# Patient Record
Sex: FEMALE | Race: WHITE | NOT HISPANIC OR LATINO
[De-identification: names, ages, dates, MRNs, and addresses within clinical notes are randomized per-mention and may not be internally consistent; named-entity substitution may affect disease eponyms.]

---

## 2017-11-21 ENCOUNTER — CHARTING TRANS (OUTPATIENT)
Dept: OTHER | Age: 32
End: 2017-11-21

## 2017-11-22 ENCOUNTER — CHARTING TRANS (OUTPATIENT)
Dept: OTHER | Age: 32
End: 2017-11-22

## 2017-12-05 ENCOUNTER — LAB SERVICES (OUTPATIENT)
Dept: OTHER | Age: 32
End: 2017-12-05

## 2017-12-05 ENCOUNTER — CHARTING TRANS (OUTPATIENT)
Dept: OTHER | Age: 32
End: 2017-12-05

## 2017-12-07 LAB — AP REPORT: NORMAL

## 2017-12-15 LAB — HPV I/H RISK 4 DNA CVX QL NAA+PROBE: NORMAL

## 2018-11-27 VITALS
SYSTOLIC BLOOD PRESSURE: 138 MMHG | HEIGHT: 66 IN | BODY MASS INDEX: 30.37 KG/M2 | DIASTOLIC BLOOD PRESSURE: 90 MMHG | WEIGHT: 189 LBS

## 2018-12-04 PROCEDURE — 87624 HPV HI-RISK TYP POOLED RSLT: CPT | Performed by: OBSTETRICS & GYNECOLOGY

## 2018-12-04 PROCEDURE — 87625 HPV TYPES 16 & 18 ONLY: CPT | Performed by: OBSTETRICS & GYNECOLOGY

## 2018-12-04 PROCEDURE — 88175 CYTOPATH C/V AUTO FLUID REDO: CPT | Performed by: OBSTETRICS & GYNECOLOGY

## 2019-01-10 PROCEDURE — 88342 IMHCHEM/IMCYTCHM 1ST ANTB: CPT | Performed by: OBSTETRICS & GYNECOLOGY

## 2019-01-10 PROCEDURE — 88305 TISSUE EXAM BY PATHOLOGIST: CPT | Performed by: OBSTETRICS & GYNECOLOGY

## 2019-02-12 ENCOUNTER — HOSPITAL ENCOUNTER (OUTPATIENT)
Facility: HOSPITAL | Age: 34
Setting detail: HOSPITAL OUTPATIENT SURGERY
Discharge: HOME | End: 2019-02-12
Attending: OBSTETRICS & GYNECOLOGY | Admitting: OBSTETRICS & GYNECOLOGY
Payer: COMMERCIAL

## 2019-02-12 VITALS
HEIGHT: 63 IN | SYSTOLIC BLOOD PRESSURE: 124 MMHG | HEART RATE: 72 BPM | RESPIRATION RATE: 16 BRPM | WEIGHT: 198 LBS | BODY MASS INDEX: 35.08 KG/M2 | OXYGEN SATURATION: 97 % | DIASTOLIC BLOOD PRESSURE: 86 MMHG | TEMPERATURE: 98 F

## 2019-02-12 LAB
POCT LOT NUMBER: NORMAL
POCT URINE PREGNANCY: NEGATIVE
PROCEDURE CONTROL: YES

## 2019-02-12 PROCEDURE — 88307 TISSUE EXAM BY PATHOLOGIST: CPT | Performed by: OBSTETRICS & GYNECOLOGY

## 2019-02-12 PROCEDURE — 88342 IMHCHEM/IMCYTCHM 1ST ANTB: CPT | Performed by: OBSTETRICS & GYNECOLOGY

## 2019-02-12 PROCEDURE — 0UBC7ZZ EXCISION OF CERVIX, VIA NATURAL OR ARTIFICIAL OPENING: ICD-10-PCS | Performed by: OBSTETRICS & GYNECOLOGY

## 2019-02-12 PROCEDURE — 81025 URINE PREGNANCY TEST: CPT | Performed by: OBSTETRICS & GYNECOLOGY

## 2019-02-12 RX ORDER — HYDROCODONE BITARTRATE AND ACETAMINOPHEN 5; 325 MG/1; MG/1
1 TABLET ORAL AS NEEDED
Status: DISCONTINUED | OUTPATIENT
Start: 2019-02-12 | End: 2019-02-12

## 2019-02-12 RX ORDER — ACETAMINOPHEN 500 MG
1000 TABLET ORAL ONCE
Status: DISCONTINUED | OUTPATIENT
Start: 2019-02-12 | End: 2019-02-12

## 2019-02-12 RX ORDER — SODIUM CHLORIDE, SODIUM LACTATE, POTASSIUM CHLORIDE, CALCIUM CHLORIDE 600; 310; 30; 20 MG/100ML; MG/100ML; MG/100ML; MG/100ML
INJECTION, SOLUTION INTRAVENOUS CONTINUOUS
Status: DISCONTINUED | OUTPATIENT
Start: 2019-02-12 | End: 2019-02-12

## 2019-02-12 RX ORDER — ONDANSETRON 2 MG/ML
4 INJECTION INTRAMUSCULAR; INTRAVENOUS AS NEEDED
Status: DISCONTINUED | OUTPATIENT
Start: 2019-02-12 | End: 2019-02-12

## 2019-02-12 RX ORDER — KETOROLAC TROMETHAMINE 30 MG/ML
30 INJECTION, SOLUTION INTRAMUSCULAR; INTRAVENOUS EVERY 6 HOURS PRN
Status: CANCELLED | OUTPATIENT
Start: 2019-02-12 | End: 2019-02-12

## 2019-02-12 RX ORDER — HYDROCODONE BITARTRATE AND ACETAMINOPHEN 5; 325 MG/1; MG/1
2 TABLET ORAL AS NEEDED
Status: DISCONTINUED | OUTPATIENT
Start: 2019-02-12 | End: 2019-02-12

## 2019-02-12 RX ORDER — CEFAZOLIN SODIUM/WATER 2 G/20 ML
2 SYRINGE (ML) INTRAVENOUS ONCE
Status: DISCONTINUED | OUTPATIENT
Start: 2019-02-12 | End: 2019-02-12

## 2019-02-12 RX ORDER — HYDROMORPHONE HYDROCHLORIDE 1 MG/ML
0.4 INJECTION, SOLUTION INTRAMUSCULAR; INTRAVENOUS; SUBCUTANEOUS EVERY 5 MIN PRN
Status: DISCONTINUED | OUTPATIENT
Start: 2019-02-12 | End: 2019-02-12

## 2019-02-12 RX ORDER — ACETAMINOPHEN 500 MG
1000 TABLET ORAL ONCE
COMMUNITY
End: 2019-07-24 | Stop reason: ALTCHOICE

## 2019-02-12 RX ORDER — METOCLOPRAMIDE HYDROCHLORIDE 5 MG/ML
10 INJECTION INTRAMUSCULAR; INTRAVENOUS AS NEEDED
Status: DISCONTINUED | OUTPATIENT
Start: 2019-02-12 | End: 2019-02-12

## 2019-02-12 RX ORDER — NALOXONE HYDROCHLORIDE 0.4 MG/ML
80 INJECTION, SOLUTION INTRAMUSCULAR; INTRAVENOUS; SUBCUTANEOUS AS NEEDED
Status: DISCONTINUED | OUTPATIENT
Start: 2019-02-12 | End: 2019-02-12

## 2019-02-12 NOTE — PROGRESS NOTES
[facility]  Brief Op Note    Rudy Mason Location: OR   CSN [id number] MRN [id number]   Admission Date 2/12/2019 Operation Date 2/12/2019   Attending Physician No att. providers found Operating Physician Aminta Saleh MD     Pre-Operat

## 2019-09-26 PROCEDURE — 88175 CYTOPATH C/V AUTO FLUID REDO: CPT | Performed by: OBSTETRICS & GYNECOLOGY

## 2019-09-26 PROCEDURE — 87624 HPV HI-RISK TYP POOLED RSLT: CPT | Performed by: OBSTETRICS & GYNECOLOGY

## 2021-04-08 NOTE — PROGRESS NOTES
Outpatient Maternal-Fetal Medicine Consultation    Dear Dr. Matt Mackey,    Thank you for requesting ultrasound evaluation and maternal fetal medicine consultation on your patient Aram Adamson.  As you are aware she is a 37year old female with a S Allergies: No Known Allergies      PHYSICAL EXAMINATION:  /86   Pulse 83   Ht 5' 3\" (1.6 m)   Wt 205 lb (93 kg)   LMP 11/23/2020   BMI 36.31 kg/m²   General: alert and oriented,no acute distress  Abdomen: gravid, soft, non-tender  Extremities: non delivery by ~ 40% in women with a sandoval pregnancy and ultrasound finding of short cervix with or without a history of  delivery.   Cervical cerclage is indicated in patients with prior  delivery and short cervix and in some patients with v of  birth in these patients, therefore, should be directed toward prevention or management of medical and obstetrical complications.                Both prepregnancy obesity and excessive maternal weight gain before or during pregnancy contribute to gravidas significantly reduced the incidence of gestational diabetes, gestational hypertension,  deliveries and C-sections.   In the study, women were assigned to riding a stationary cycle for  30 minutes 3 times per week, keeping HR<140 bpm.  OLGA camacho hesitate to contact me if additional questions or concerns arise. Edith Weston M.D. The majority of the time (>50%) was spent in review of records, consultation and coordination of care. Our discussion is summarized above.  The approximate physician

## 2021-04-16 ENCOUNTER — OFFICE VISIT (OUTPATIENT)
Dept: PERINATAL CARE | Facility: HOSPITAL | Age: 36
End: 2021-04-16
Attending: OBSTETRICS & GYNECOLOGY
Payer: COMMERCIAL

## 2021-04-16 VITALS
SYSTOLIC BLOOD PRESSURE: 125 MMHG | BODY MASS INDEX: 36.32 KG/M2 | DIASTOLIC BLOOD PRESSURE: 86 MMHG | HEART RATE: 83 BPM | WEIGHT: 205 LBS | HEIGHT: 63 IN

## 2021-04-16 DIAGNOSIS — O34.12 UTERINE FIBROIDS AFFECTING PREGNANCY IN SECOND TRIMESTER: ICD-10-CM

## 2021-04-16 DIAGNOSIS — Z98.890 H/O LEEP (LOOP ELECTROSURGICAL EXCISION PROCEDURE) OF CERVIX COMPLICATING PREGNANCY, SECOND TRIMESTER: ICD-10-CM

## 2021-04-16 DIAGNOSIS — O99.212 OBESITY AFFECTING PREGNANCY IN SECOND TRIMESTER: ICD-10-CM

## 2021-04-16 DIAGNOSIS — O34.42 H/O LEEP (LOOP ELECTROSURGICAL EXCISION PROCEDURE) OF CERVIX COMPLICATING PREGNANCY, SECOND TRIMESTER: ICD-10-CM

## 2021-04-16 DIAGNOSIS — D25.9 UTERINE FIBROIDS AFFECTING PREGNANCY IN SECOND TRIMESTER: ICD-10-CM

## 2021-04-16 DIAGNOSIS — O99.212 OBESITY AFFECTING PREGNANCY IN SECOND TRIMESTER: Primary | ICD-10-CM

## 2021-04-16 PROBLEM — O99.210 OBESITY COMPLICATING PREGNANCY: Status: ACTIVE | Noted: 2021-04-16

## 2021-04-16 PROCEDURE — 76811 OB US DETAILED SNGL FETUS: CPT | Performed by: OBSTETRICS & GYNECOLOGY

## 2021-04-16 PROCEDURE — 99244 OFF/OP CNSLTJ NEW/EST MOD 40: CPT | Performed by: OBSTETRICS & GYNECOLOGY

## 2021-04-16 RX ORDER — ASPIRIN 81 MG/1
81 TABLET ORAL DAILY
Status: ON HOLD | COMMUNITY
End: 2021-08-21

## 2021-04-16 NOTE — PROGRESS NOTES
Pt here for level II ultrasound  +FM  No complaints, denies headache, blurry vision, epigastric pain or sudden swelling

## 2021-08-17 ENCOUNTER — HOSPITAL ENCOUNTER (INPATIENT)
Facility: HOSPITAL | Age: 36
LOS: 5 days | Discharge: HOME | End: 2021-08-22
Attending: OBSTETRICS & GYNECOLOGY | Admitting: OBSTETRICS & GYNECOLOGY
Payer: COMMERCIAL

## 2021-08-17 PROBLEM — Z34.90 PREGNANCY: Status: ACTIVE | Noted: 2021-08-17

## 2021-08-17 LAB
ALBUMIN SERPL-MCNC: 2.9 G/DL (ref 3.4–5)
ALBUMIN/GLOB SERPL: 0.7 {RATIO} (ref 1–2)
ALP LIVER SERPL-CCNC: 179 U/L
ALT SERPL-CCNC: 27 U/L
ANION GAP SERPL CALC-SCNC: 6 MMOL/L (ref 0–18)
ANTIBODY SCREEN: NEGATIVE
AST SERPL-CCNC: 14 U/L (ref 15–37)
BASOPHILS # BLD AUTO: 0.06 X10(3) UL (ref 0–0.2)
BASOPHILS NFR BLD AUTO: 0.4 %
BILIRUB SERPL-MCNC: 0.3 MG/DL (ref 0.1–2)
BILIRUBIN URINE: NEGATIVE
BLOOD URINE: NEGATIVE
BUN BLD-MCNC: 9 MG/DL (ref 7–18)
CALCIUM BLD-MCNC: 8.8 MG/DL (ref 8.5–10.1)
CHLORIDE SERPL-SCNC: 110 MMOL/L (ref 98–112)
CO2 SERPL-SCNC: 23 MMOL/L (ref 21–32)
CONTROL RUN WITHIN 24 HOURS?: YES
CREAT BLD-MCNC: 0.68 MG/DL
CREAT UR-SCNC: <13 MG/DL
EOSINOPHIL # BLD AUTO: 0.3 X10(3) UL (ref 0–0.7)
EOSINOPHIL NFR BLD AUTO: 1.9 %
ERYTHROCYTE [DISTWIDTH] IN BLOOD BY AUTOMATED COUNT: 14.3 %
GLOBULIN PLAS-MCNC: 3.9 G/DL (ref 2.8–4.4)
GLUCOSE BLD-MCNC: 85 MG/DL (ref 70–99)
GLUCOSE URINE: NEGATIVE
HCT VFR BLD AUTO: 36.8 %
HGB BLD-MCNC: 12.1 G/DL
IMM GRANULOCYTES # BLD AUTO: 0.14 X10(3) UL (ref 0–1)
IMM GRANULOCYTES NFR BLD: 0.9 %
KETONE URINE: NEGATIVE
LEUKOCYTE ESTERASE URINE: NEGATIVE
LYMPHOCYTES # BLD AUTO: 2.2 X10(3) UL (ref 1–4)
LYMPHOCYTES NFR BLD AUTO: 14.1 %
M PROTEIN MFR SERPL ELPH: 6.8 G/DL (ref 6.4–8.2)
MCH RBC QN AUTO: 31.4 PG (ref 26–34)
MCHC RBC AUTO-ENTMCNC: 32.9 G/DL (ref 31–37)
MCV RBC AUTO: 95.6 FL
MONOCYTES # BLD AUTO: 0.99 X10(3) UL (ref 0.1–1)
MONOCYTES NFR BLD AUTO: 6.4 %
NEUTROPHILS # BLD AUTO: 11.86 X10 (3) UL (ref 1.5–7.7)
NEUTROPHILS # BLD AUTO: 11.86 X10(3) UL (ref 1.5–7.7)
NEUTROPHILS NFR BLD AUTO: 76.3 %
NITRITE URINE: NEGATIVE
OSMOLALITY SERPL CALC.SUM OF ELEC: 286 MOSM/KG (ref 275–295)
PH URINE: 7 (ref 5–8)
PLATELET # BLD AUTO: 231 10(3)UL (ref 150–450)
POTASSIUM SERPL-SCNC: 3.8 MMOL/L (ref 3.5–5.1)
PROT UR-MCNC: 5.3 MG/DL
PROTEIN URINE: NEGATIVE
RBC # BLD AUTO: 3.85 X10(6)UL
RH BLOOD TYPE: POSITIVE
SARS-COV-2 RNA RESP QL NAA+PROBE: NOT DETECTED
SODIUM SERPL-SCNC: 139 MMOL/L (ref 136–145)
SPEC GRAVITY: 1.01 (ref 1–1.03)
T PALLIDUM AB SER QL IA: NONREACTIVE
URATE SERPL-MCNC: 5.1 MG/DL
URINE CLARITY: CLEAR
URINE COLOR: YELLOW
UROBILINOGEN URINE: 0.2
WBC # BLD AUTO: 15.6 X10(3) UL (ref 4–11)

## 2021-08-17 PROCEDURE — 86850 RBC ANTIBODY SCREEN: CPT | Performed by: OBSTETRICS & GYNECOLOGY

## 2021-08-17 PROCEDURE — 85025 COMPLETE CBC W/AUTO DIFF WBC: CPT | Performed by: OBSTETRICS & GYNECOLOGY

## 2021-08-17 PROCEDURE — 84156 ASSAY OF PROTEIN URINE: CPT | Performed by: OBSTETRICS & GYNECOLOGY

## 2021-08-17 PROCEDURE — 86900 BLOOD TYPING SEROLOGIC ABO: CPT | Performed by: OBSTETRICS & GYNECOLOGY

## 2021-08-17 PROCEDURE — 82570 ASSAY OF URINE CREATININE: CPT | Performed by: OBSTETRICS & GYNECOLOGY

## 2021-08-17 PROCEDURE — 80053 COMPREHEN METABOLIC PANEL: CPT | Performed by: OBSTETRICS & GYNECOLOGY

## 2021-08-17 PROCEDURE — 86780 TREPONEMA PALLIDUM: CPT | Performed by: OBSTETRICS & GYNECOLOGY

## 2021-08-17 PROCEDURE — 86901 BLOOD TYPING SEROLOGIC RH(D): CPT | Performed by: OBSTETRICS & GYNECOLOGY

## 2021-08-17 PROCEDURE — 3E0P7VZ INTRODUCTION OF HORMONE INTO FEMALE REPRODUCTIVE, VIA NATURAL OR ARTIFICIAL OPENING: ICD-10-PCS | Performed by: OBSTETRICS & GYNECOLOGY

## 2021-08-17 PROCEDURE — 84550 ASSAY OF BLOOD/URIC ACID: CPT | Performed by: OBSTETRICS & GYNECOLOGY

## 2021-08-17 PROCEDURE — 99214 OFFICE O/P EST MOD 30 MIN: CPT

## 2021-08-17 RX ORDER — TERBUTALINE SULFATE 1 MG/ML
0.25 INJECTION, SOLUTION SUBCUTANEOUS AS NEEDED
Status: DISCONTINUED | OUTPATIENT
Start: 2021-08-17 | End: 2021-08-18

## 2021-08-17 RX ORDER — SODIUM CHLORIDE, SODIUM LACTATE, POTASSIUM CHLORIDE, CALCIUM CHLORIDE 600; 310; 30; 20 MG/100ML; MG/100ML; MG/100ML; MG/100ML
INJECTION, SOLUTION INTRAVENOUS CONTINUOUS
Status: DISCONTINUED | OUTPATIENT
Start: 2021-08-17 | End: 2021-08-18

## 2021-08-17 RX ORDER — IBUPROFEN 600 MG/1
600 TABLET ORAL EVERY 6 HOURS PRN
Status: DISCONTINUED | OUTPATIENT
Start: 2021-08-17 | End: 2021-08-18

## 2021-08-17 RX ORDER — ACETAMINOPHEN 500 MG
500 TABLET ORAL EVERY 6 HOURS PRN
Status: DISCONTINUED | OUTPATIENT
Start: 2021-08-17 | End: 2021-08-18

## 2021-08-17 RX ORDER — ONDANSETRON 2 MG/ML
4 INJECTION INTRAMUSCULAR; INTRAVENOUS EVERY 6 HOURS PRN
Status: DISCONTINUED | OUTPATIENT
Start: 2021-08-17 | End: 2021-08-18

## 2021-08-17 RX ORDER — TRISODIUM CITRATE DIHYDRATE AND CITRIC ACID MONOHYDRATE 500; 334 MG/5ML; MG/5ML
30 SOLUTION ORAL AS NEEDED
Status: COMPLETED | OUTPATIENT
Start: 2021-08-17 | End: 2021-08-18

## 2021-08-17 RX ORDER — AMMONIA INHALANTS 0.04 G/.3ML
0.3 INHALANT RESPIRATORY (INHALATION) AS NEEDED
Status: DISCONTINUED | OUTPATIENT
Start: 2021-08-17 | End: 2021-08-18

## 2021-08-17 RX ORDER — DEXTROSE, SODIUM CHLORIDE, SODIUM LACTATE, POTASSIUM CHLORIDE, AND CALCIUM CHLORIDE 5; .6; .31; .03; .02 G/100ML; G/100ML; G/100ML; G/100ML; G/100ML
INJECTION, SOLUTION INTRAVENOUS AS NEEDED
Status: DISCONTINUED | OUTPATIENT
Start: 2021-08-17 | End: 2021-08-18

## 2021-08-17 NOTE — H&P
Pt is 36 y/o G1 at 38w 1 d who was sent from the office for increased BPs    PMH - Known 7 cm posterior fibroid              Failed 1 hour - 1 abnl on 3 hour              Previous AIS - leep done              Was on Baby ASA to lower risk of developing pre

## 2021-08-17 NOTE — PROGRESS NOTES
Pt is a 37year old female admitted to TRG2/TRG2-A. Patient presents with:  R/o Pih: had elevated pressures in office     Pt is  38w1d intra-uterine pregnancy. History obtained, consents signed. Oriented to room, staff, and plan of care.

## 2021-08-18 ENCOUNTER — ANESTHESIA EVENT (OUTPATIENT)
Dept: OBGYN UNIT | Facility: HOSPITAL | Age: 36
End: 2021-08-18
Payer: COMMERCIAL

## 2021-08-18 ENCOUNTER — ANESTHESIA (OUTPATIENT)
Dept: OBGYN UNIT | Facility: HOSPITAL | Age: 36
End: 2021-08-18
Payer: COMMERCIAL

## 2021-08-18 PROCEDURE — 3E033VJ INTRODUCTION OF OTHER HORMONE INTO PERIPHERAL VEIN, PERCUTANEOUS APPROACH: ICD-10-PCS | Performed by: OBSTETRICS & GYNECOLOGY

## 2021-08-18 RX ORDER — DIPHENHYDRAMINE HYDROCHLORIDE 50 MG/ML
25 INJECTION INTRAMUSCULAR; INTRAVENOUS ONCE AS NEEDED
Status: DISCONTINUED | OUTPATIENT
Start: 2021-08-18 | End: 2021-08-18

## 2021-08-18 RX ORDER — DIPHENHYDRAMINE HYDROCHLORIDE 50 MG/ML
12.5 INJECTION INTRAMUSCULAR; INTRAVENOUS EVERY 4 HOURS PRN
Status: DISCONTINUED | OUTPATIENT
Start: 2021-08-18 | End: 2021-08-22

## 2021-08-18 RX ORDER — KETOROLAC TROMETHAMINE 30 MG/ML
INJECTION, SOLUTION INTRAMUSCULAR; INTRAVENOUS
Status: COMPLETED
Start: 2021-08-18 | End: 2021-08-18

## 2021-08-18 RX ORDER — HYDROCODONE BITARTRATE AND ACETAMINOPHEN 5; 325 MG/1; MG/1
1 TABLET ORAL EVERY 6 HOURS PRN
Status: DISCONTINUED | OUTPATIENT
Start: 2021-08-18 | End: 2021-08-22

## 2021-08-18 RX ORDER — CEFAZOLIN SODIUM/WATER 2 G/20 ML
SYRINGE (ML) INTRAVENOUS
Status: DISPENSED
Start: 2021-08-18 | End: 2021-08-18

## 2021-08-18 RX ORDER — NALOXONE HYDROCHLORIDE 0.4 MG/ML
0.08 INJECTION, SOLUTION INTRAMUSCULAR; INTRAVENOUS; SUBCUTANEOUS
Status: ACTIVE | OUTPATIENT
Start: 2021-08-18 | End: 2021-08-19

## 2021-08-18 RX ORDER — NALBUPHINE HCL 10 MG/ML
2.5 AMPUL (ML) INJECTION
Status: DISCONTINUED | OUTPATIENT
Start: 2021-08-18 | End: 2021-08-18

## 2021-08-18 RX ORDER — BISACODYL 10 MG
10 SUPPOSITORY, RECTAL RECTAL
Status: DISCONTINUED | OUTPATIENT
Start: 2021-08-18 | End: 2021-08-22

## 2021-08-18 RX ORDER — SIMETHICONE 80 MG
80 TABLET,CHEWABLE ORAL 3 TIMES DAILY PRN
Status: DISCONTINUED | OUTPATIENT
Start: 2021-08-18 | End: 2021-08-22

## 2021-08-18 RX ORDER — KETOROLAC TROMETHAMINE 30 MG/ML
30 INJECTION, SOLUTION INTRAMUSCULAR; INTRAVENOUS ONCE AS NEEDED
Status: DISCONTINUED | OUTPATIENT
Start: 2021-08-18 | End: 2021-08-18

## 2021-08-18 RX ORDER — BUPIVACAINE HYDROCHLORIDE 7.5 MG/ML
INJECTION, SOLUTION INTRASPINAL AS NEEDED
Status: DISCONTINUED | OUTPATIENT
Start: 2021-08-18 | End: 2021-08-18 | Stop reason: SURG

## 2021-08-18 RX ORDER — ONDANSETRON 2 MG/ML
INJECTION INTRAMUSCULAR; INTRAVENOUS AS NEEDED
Status: DISCONTINUED | OUTPATIENT
Start: 2021-08-18 | End: 2021-08-18 | Stop reason: SURG

## 2021-08-18 RX ORDER — DOCUSATE SODIUM 100 MG/1
100 CAPSULE, LIQUID FILLED ORAL
Status: DISCONTINUED | OUTPATIENT
Start: 2021-08-19 | End: 2021-08-22

## 2021-08-18 RX ORDER — KETOROLAC TROMETHAMINE 30 MG/ML
30 INJECTION, SOLUTION INTRAMUSCULAR; INTRAVENOUS EVERY 6 HOURS
Status: DISPENSED | OUTPATIENT
Start: 2021-08-18 | End: 2021-08-19

## 2021-08-18 RX ORDER — CEFAZOLIN SODIUM/WATER 2 G/20 ML
2 SYRINGE (ML) INTRAVENOUS EVERY 6 HOURS
Status: COMPLETED | OUTPATIENT
Start: 2021-08-18 | End: 2021-08-18

## 2021-08-18 RX ORDER — IBUPROFEN 600 MG/1
600 TABLET ORAL EVERY 6 HOURS
Status: DISCONTINUED | OUTPATIENT
Start: 2021-08-19 | End: 2021-08-22

## 2021-08-18 RX ORDER — HYDROCODONE BITARTRATE AND ACETAMINOPHEN 5; 325 MG/1; MG/1
2 TABLET ORAL EVERY 6 HOURS PRN
Status: DISCONTINUED | OUTPATIENT
Start: 2021-08-18 | End: 2021-08-22

## 2021-08-18 RX ORDER — SODIUM CHLORIDE, SODIUM LACTATE, POTASSIUM CHLORIDE, CALCIUM CHLORIDE 600; 310; 30; 20 MG/100ML; MG/100ML; MG/100ML; MG/100ML
INJECTION, SOLUTION INTRAVENOUS CONTINUOUS
Status: DISCONTINUED | OUTPATIENT
Start: 2021-08-18 | End: 2021-08-18

## 2021-08-18 RX ORDER — NALBUPHINE HCL 10 MG/ML
2.5 AMPUL (ML) INJECTION EVERY 4 HOURS PRN
Status: DISCONTINUED | OUTPATIENT
Start: 2021-08-18 | End: 2021-08-22

## 2021-08-18 RX ORDER — ACETAMINOPHEN 500 MG
1000 TABLET ORAL EVERY 6 HOURS
Status: DISCONTINUED | OUTPATIENT
Start: 2021-08-18 | End: 2021-08-22

## 2021-08-18 RX ORDER — DEXTROSE, SODIUM CHLORIDE, SODIUM LACTATE, POTASSIUM CHLORIDE, AND CALCIUM CHLORIDE 5; .6; .31; .03; .02 G/100ML; G/100ML; G/100ML; G/100ML; G/100ML
INJECTION, SOLUTION INTRAVENOUS CONTINUOUS PRN
Status: DISCONTINUED | OUTPATIENT
Start: 2021-08-18 | End: 2021-08-22

## 2021-08-18 RX ORDER — TRISODIUM CITRATE DIHYDRATE AND CITRIC ACID MONOHYDRATE 500; 334 MG/5ML; MG/5ML
SOLUTION ORAL
Status: COMPLETED
Start: 2021-08-18 | End: 2021-08-18

## 2021-08-18 RX ORDER — MORPHINE SULFATE 2 MG/ML
INJECTION, SOLUTION INTRAMUSCULAR; INTRAVENOUS AS NEEDED
Status: DISCONTINUED | OUTPATIENT
Start: 2021-08-18 | End: 2021-08-18 | Stop reason: SURG

## 2021-08-18 RX ORDER — CEFAZOLIN SODIUM/WATER 2 G/20 ML
2 SYRINGE (ML) INTRAVENOUS ONCE
Status: COMPLETED | OUTPATIENT
Start: 2021-08-18 | End: 2021-08-18

## 2021-08-18 RX ORDER — ONDANSETRON 2 MG/ML
4 INJECTION INTRAMUSCULAR; INTRAVENOUS ONCE AS NEEDED
Status: DISCONTINUED | OUTPATIENT
Start: 2021-08-18 | End: 2021-08-18

## 2021-08-18 RX ORDER — DIPHENHYDRAMINE HCL 25 MG
25 CAPSULE ORAL EVERY 4 HOURS PRN
Status: DISCONTINUED | OUTPATIENT
Start: 2021-08-18 | End: 2021-08-22

## 2021-08-18 RX ORDER — DEXAMETHASONE SODIUM PHOSPHATE 4 MG/ML
VIAL (ML) INJECTION AS NEEDED
Status: DISCONTINUED | OUTPATIENT
Start: 2021-08-18 | End: 2021-08-18 | Stop reason: SURG

## 2021-08-18 RX ORDER — ONDANSETRON 2 MG/ML
4 INJECTION INTRAMUSCULAR; INTRAVENOUS EVERY 6 HOURS PRN
Status: DISCONTINUED | OUTPATIENT
Start: 2021-08-18 | End: 2021-08-22

## 2021-08-18 RX ADMIN — ONDANSETRON 4 MG: 2 INJECTION INTRAMUSCULAR; INTRAVENOUS at 04:06:00

## 2021-08-18 RX ADMIN — DEXAMETHASONE SODIUM PHOSPHATE 4 MG: 4 MG/ML VIAL (ML) INJECTION at 03:54:00

## 2021-08-18 RX ADMIN — CEFAZOLIN SODIUM/WATER 2 G: 2 G/20 ML SYRINGE (ML) INTRAVENOUS at 03:53:00

## 2021-08-18 RX ADMIN — MORPHINE SULFATE 0.2 MG: 2 INJECTION, SOLUTION INTRAMUSCULAR; INTRAVENOUS at 03:51:00

## 2021-08-18 RX ADMIN — BUPIVACAINE HYDROCHLORIDE 1.6 ML: 7.5 INJECTION, SOLUTION INTRASPINAL at 03:51:00

## 2021-08-18 NOTE — BRIEF OP NOTE
Pre-Operative Diagnosis: 38.3 IUP Fetal intolerance of labor, gestational HTN     Post-Operative Diagnosis: Same      Procedure Performed: primary LTCS   SECTION    Surgeon(s) and Role:     Caridad Tellez MD - Primary     * Sendy Madrid MD - Assist

## 2021-08-18 NOTE — ANESTHESIA POSTPROCEDURE EVALUATION
[address] [place] [address] A Sutphen-Suni Patient Status:  Inpatient   Age/Gender 37year old female MRN [id number]   Location [facility] Attending Lucia Waller MD   Hosp Day # 1 PCP Maureen Mojica DO       Anesthesia Post-op N

## 2021-08-18 NOTE — PROGRESS NOTES
Pt feeling cramping - had 2 doses of cytotec - some random variables/early decels - not severe - moderate variability - + accels  Ve - still closed   A/P Baby stable - will not give 3rd dose of cytotec - will start pitocin about 1:30 and then go up slowly

## 2021-08-18 NOTE — ANESTHESIA PROCEDURE NOTES
Spinal Block  Performed by: Barbara Bernal MD  Authorized by: Barbara Bernal MD       General Information and Staff    Start Time:  8/18/2021 3:51 AM  End Time:  8/18/2021 3:52 AM  Anesthesiologist:  Barbara Bernal MD  Performed by:   Anesthesio

## 2021-08-18 NOTE — PLAN OF CARE
Problem: POSTPARTUM  Goal: Long Term Goal:Experiences normal postpartum course  Description: INTERVENTIONS:  - Assess and monitor vital signs and lab values. - Assess fundus and lochia. - Provide ice/sitz baths for perineum discomfort.   - Monitor heali pain/trauma. - Instruct and provide assistance with proper latch. - Review techniques for milk expression (breast pumping) and storage of breast milk. Provide pumping equipment/supplies, instructions and assistance, as needed.   - Encourage rooming-in and strengthening/mobility  - Encourage toileting schedule  Outcome: Progressing     Problem: SAFETY ADULT - FALL  Goal: Free from fall injury  Description: INTERVENTIONS:  - Assess pt frequently for physical needs  - Identify cognitive and physical deficits a

## 2021-08-18 NOTE — PROGRESS NOTES
Called to evaluate FHT's - decels that are more persistent - pitocin is currently off - late decels - moderate variability - had times of minimal   Pt not uncomfortable  A/P Fetal intolerance - pt remote from delivery - not in labor yet - D/W pt and her hu

## 2021-08-18 NOTE — ANESTHESIA PREPROCEDURE EVALUATION
PRE-OP EVALUATION    Patient Name: Latoya Hunt SYFHJRZ-FIWBLUY    Admit Diagnosis: Pregnancy    Pre-op Diagnosis: * No pre-op diagnosis entered *     SECTION    Anesthesia Procedure:  SECTION (N/A )    Surgeon(s) and Role:     Junie Huynh time        Allergies: Patient has no known allergies. Anesthesia Evaluation    Patient summary reviewed. Anesthetic Complications  (-) history of anesthetic complications         GI/Hepatic/Renal    Negative GI/hepatic/renal ROS. bilaterally.                Other findings            ASA: 2   Plan: spinal                             Present on Admission:  **None**

## 2021-08-18 NOTE — OPERATIVE REPORT
[address] [place]    PATIENT'S NAME: Leonila Rasmussen   ATTENDING PHYSICIAN: Rj Hickman M.D. OPERATING PHYSICIAN: Rj Hickman M.D.    PATIENT ACCOUNT#:   [de-identified]    LOCATION:  [address] [place] [address]  MEDICAL RECORD #:   [id number]       DATE fluid. As soon as the uterus was opened, the cord was presenting, which was wrapped around the baby's head, not neck. As the baby was delivered, the cord had been reduced. Nares and mouth were suctioned.   The rest of the infant delivered easily; gay

## 2021-08-19 LAB
BASOPHILS # BLD AUTO: 0.05 X10(3) UL (ref 0–0.2)
BASOPHILS NFR BLD AUTO: 0.3 %
EOSINOPHIL # BLD AUTO: 0.28 X10(3) UL (ref 0–0.7)
EOSINOPHIL NFR BLD AUTO: 1.7 %
ERYTHROCYTE [DISTWIDTH] IN BLOOD BY AUTOMATED COUNT: 14.4 %
HCT VFR BLD AUTO: 31.1 %
HGB BLD-MCNC: 10.5 G/DL
IMM GRANULOCYTES # BLD AUTO: 0.13 X10(3) UL (ref 0–1)
IMM GRANULOCYTES NFR BLD: 0.8 %
LYMPHOCYTES # BLD AUTO: 2.26 X10(3) UL (ref 1–4)
LYMPHOCYTES NFR BLD AUTO: 13.5 %
MCH RBC QN AUTO: 32.1 PG (ref 26–34)
MCHC RBC AUTO-ENTMCNC: 33.8 G/DL (ref 31–37)
MCV RBC AUTO: 95.1 FL
MONOCYTES # BLD AUTO: 1.11 X10(3) UL (ref 0.1–1)
MONOCYTES NFR BLD AUTO: 6.7 %
NEUTROPHILS # BLD AUTO: 12.86 X10 (3) UL (ref 1.5–7.7)
NEUTROPHILS # BLD AUTO: 12.86 X10(3) UL (ref 1.5–7.7)
NEUTROPHILS NFR BLD AUTO: 77 %
PLATELET # BLD AUTO: 188 10(3)UL (ref 150–450)
RBC # BLD AUTO: 3.27 X10(6)UL
WBC # BLD AUTO: 16.7 X10(3) UL (ref 4–11)

## 2021-08-19 PROCEDURE — 85025 COMPLETE CBC W/AUTO DIFF WBC: CPT | Performed by: OBSTETRICS & GYNECOLOGY

## 2021-08-19 NOTE — PROGRESS NOTES
Labor Analgesia Follow Up Note    Patient underwent epidural anesthesia for labor analgesia,    Placenta Date/Time: 8/18/2021  4:06 AM    Delivery Date/Time[de-identified] 8/18/2021  4:05 AM    /84 (BP Location: Left arm)   Pulse 109   Temp 98.6 °F (37 °C) (Oral)

## 2021-08-19 NOTE — PROGRESS NOTES
[facility] 1SW-J michael    Mike Decker Patient Status:  Inpatient   Age/Gender 37year old female MRN [id number]   [facility] 1SW-J Attending Matt Rowe MD   Hosp Day # 2 PCP Veronique Jacobs, DO      Anesthesia Pain

## 2021-08-19 NOTE — PROGRESS NOTES
OB Progress Note POD#1    S: She is feeling well. Minimal VB. Pain controlled. Breastfeeding. Voiding without difficulty, + flatus, no BM    O:  Blood pressure 137/84, pulse 109, temperature 98.6 °F (37 °C), temperature source Oral, resp.  rate 20, height 5

## 2021-08-20 RX ORDER — FUROSEMIDE 40 MG/1
40 TABLET ORAL ONCE
Status: COMPLETED | OUTPATIENT
Start: 2021-08-20 | End: 2021-08-20

## 2021-08-20 NOTE — PROGRESS NOTES
[facility]  Post-Partum Caesarean Section Progress Note    Sriram Fennel Patient Status:  Inpatient    10/8/1985 MRN [id number]   [facility] 1SW-J Attending Lilliam Nunez MD   Hosp Day # 3 PCP Tracie Warner DO     SUBJ

## 2021-08-21 RX ORDER — LABETALOL 200 MG/1
200 TABLET, FILM COATED ORAL EVERY 12 HOURS SCHEDULED
Qty: 60 TABLET | Refills: 0 | Status: SHIPPED | OUTPATIENT
Start: 2021-08-21 | End: 2021-09-17

## 2021-08-21 RX ORDER — LABETALOL 200 MG/1
200 TABLET, FILM COATED ORAL EVERY 12 HOURS SCHEDULED
Status: DISCONTINUED | OUTPATIENT
Start: 2021-08-21 | End: 2021-08-21

## 2021-08-21 RX ORDER — NIFEDIPINE 30 MG/1
30 TABLET, EXTENDED RELEASE ORAL DAILY
Status: DISCONTINUED | OUTPATIENT
Start: 2021-08-21 | End: 2021-08-22

## 2021-08-21 RX ORDER — LABETALOL HYDROCHLORIDE 5 MG/ML
INJECTION, SOLUTION INTRAVENOUS
Status: COMPLETED
Start: 2021-08-21 | End: 2021-08-21

## 2021-08-21 RX ORDER — LABETALOL 200 MG/1
200 TABLET, FILM COATED ORAL EVERY 12 HOURS SCHEDULED
Status: DISCONTINUED | OUTPATIENT
Start: 2021-08-21 | End: 2021-08-22

## 2021-08-21 RX ORDER — HYDROCODONE BITARTRATE AND ACETAMINOPHEN 5; 325 MG/1; MG/1
1 TABLET ORAL EVERY 6 HOURS PRN
Qty: 20 TABLET | Refills: 0 | Status: SHIPPED | OUTPATIENT
Start: 2021-08-21 | End: 2021-09-29 | Stop reason: ALTCHOICE

## 2021-08-21 NOTE — PROGRESS NOTES
[facility]  Post-Partum Caesarean Section Progress Note    Floryma Char Patient Status:  Inpatient    10/8/1985 MRN [id number]   [facility] 1SW-J Attending Amador Whitten MD   Hosp Day # 4 PCP Courtney Delgado DO     SUBJ

## 2021-08-22 VITALS
HEART RATE: 87 BPM | HEIGHT: 63 IN | WEIGHT: 222 LBS | TEMPERATURE: 98 F | DIASTOLIC BLOOD PRESSURE: 85 MMHG | RESPIRATION RATE: 16 BRPM | OXYGEN SATURATION: 99 % | SYSTOLIC BLOOD PRESSURE: 146 MMHG | BODY MASS INDEX: 39.34 KG/M2

## 2021-08-22 RX ORDER — NIFEDIPINE 30 MG/1
30 TABLET, FILM COATED, EXTENDED RELEASE ORAL DAILY
Qty: 30 TABLET | Refills: 0 | Status: SHIPPED | OUTPATIENT
Start: 2021-08-22 | End: 2021-09-17

## 2021-08-22 NOTE — PROGRESS NOTES
NURSING DISCHARGE NOTE    Discharged Home via Wheelchair. Accompanied by Spouse and Support staff  Belongings Taken by patient/family.  KISS discharged and removed, ID bands checked

## 2021-08-22 NOTE — PROGRESS NOTES
[facility]  Post-Partum Caesarean Section Progress Note    Rudy Mason Patient Status:  Inpatient    10/8/1985 MRN [id number]   [facility] 1SW-J Attending Rosalinda Muniz MD   Hosp Day # 5 PCP Oliver Weems DO     SUBJ

## 2021-08-25 ENCOUNTER — TELEPHONE (OUTPATIENT)
Dept: OBGYN UNIT | Facility: HOSPITAL | Age: 36
End: 2021-08-25

## 2021-08-25 NOTE — DISCHARGE SUMMARY
[facility]  OB Discharge Summary    Ivory Kay Patient Status:  Inpatient    10/8/1985 MRN [id number]   [facility] 1SW-J Attending No att. providers found   Hosp Day # 5 PCP Nidia Monte DO     Date of Admission:

## 2021-09-07 ENCOUNTER — NURSE ONLY (OUTPATIENT)
Dept: LACTATION | Facility: HOSPITAL | Age: 36
End: 2021-09-07
Attending: OBSTETRICS & GYNECOLOGY
Payer: COMMERCIAL

## 2021-09-07 PROCEDURE — 99213 OFFICE O/P EST LOW 20 MIN: CPT

## 2021-09-07 NOTE — PROGRESS NOTES
LACTATION NOTE - MOTHER      Evaluation Type: Outpatient Initial    Problems identified  Problems identified: Knowledge deficit;Milk supply WNL    Maternal history  Maternal history: Caesarean section;Obesity  Other/comment: Gest HTN         Maternal Asses

## 2023-02-09 ENCOUNTER — WALK IN (OUTPATIENT)
Dept: URGENT CARE | Age: 38
End: 2023-02-09

## 2023-02-09 VITALS
HEART RATE: 104 BPM | RESPIRATION RATE: 16 BRPM | OXYGEN SATURATION: 98 % | SYSTOLIC BLOOD PRESSURE: 159 MMHG | TEMPERATURE: 98.2 F | DIASTOLIC BLOOD PRESSURE: 89 MMHG

## 2023-02-09 DIAGNOSIS — R19.7 DIARRHEA, UNSPECIFIED TYPE: Primary | ICD-10-CM

## 2023-02-09 LAB
FLUAV AG UPPER RESP QL IA.RAPID: NEGATIVE
FLUBV AG UPPER RESP QL IA.RAPID: NEGATIVE
SARS-COV+SARS-COV-2 AG RESP QL IA.RAPID: NOT DETECTED
TEST LOT EXPIRATION DATE: NORMAL
TEST LOT NUMBER: NORMAL

## 2023-02-09 PROCEDURE — 87428 SARSCOV & INF VIR A&B AG IA: CPT | Performed by: INTERNAL MEDICINE

## 2023-02-09 PROCEDURE — 99214 OFFICE O/P EST MOD 30 MIN: CPT | Performed by: INTERNAL MEDICINE

## 2023-02-09 RX ORDER — ONDANSETRON HYDROCHLORIDE 8 MG/1
8 TABLET, FILM COATED ORAL EVERY 8 HOURS PRN
Qty: 10 TABLET | Refills: 0 | Status: SHIPPED | OUTPATIENT
Start: 2023-02-09 | End: 2023-02-14

## 2023-02-09 ASSESSMENT — PAIN SCALES - GENERAL: PAINLEVEL: 6

## 2023-02-11 ENCOUNTER — HOSPITAL ENCOUNTER (EMERGENCY)
Facility: HOSPITAL | Age: 38
Discharge: HOME | End: 2023-02-11
Attending: EMERGENCY MEDICINE
Payer: COMMERCIAL

## 2023-02-11 VITALS
WEIGHT: 195.13 LBS | OXYGEN SATURATION: 98 % | RESPIRATION RATE: 16 BRPM | HEART RATE: 77 BPM | SYSTOLIC BLOOD PRESSURE: 123 MMHG | TEMPERATURE: 98 F | HEIGHT: 63 IN | DIASTOLIC BLOOD PRESSURE: 78 MMHG | BODY MASS INDEX: 34.57 KG/M2

## 2023-02-11 DIAGNOSIS — R19.7 NAUSEA VOMITING AND DIARRHEA: Primary | ICD-10-CM

## 2023-02-11 DIAGNOSIS — R11.2 NAUSEA VOMITING AND DIARRHEA: Primary | ICD-10-CM

## 2023-02-11 LAB
ALBUMIN SERPL-MCNC: 3.8 G/DL (ref 3.4–5)
ALBUMIN/GLOB SERPL: 1 {RATIO} (ref 1–2)
ALP LIVER SERPL-CCNC: 86 U/L
ALT SERPL-CCNC: 72 U/L
ANION GAP SERPL CALC-SCNC: 8 MMOL/L (ref 0–18)
AST SERPL-CCNC: 42 U/L (ref 15–37)
BASOPHILS # BLD AUTO: 0.04 X10(3) UL (ref 0–0.2)
BASOPHILS NFR BLD AUTO: 0.4 %
BILIRUB SERPL-MCNC: 0.5 MG/DL (ref 0.1–2)
BILIRUB UR QL STRIP.AUTO: NEGATIVE
BUN BLD-MCNC: 9 MG/DL (ref 7–18)
C DIFF TOX B STL QL: NEGATIVE
CALCIUM BLD-MCNC: 8.6 MG/DL (ref 8.5–10.1)
CHLORIDE SERPL-SCNC: 110 MMOL/L (ref 98–112)
CO2 SERPL-SCNC: 24 MMOL/L (ref 21–32)
COLOR UR AUTO: YELLOW
CREAT BLD-MCNC: 0.76 MG/DL
EOSINOPHIL # BLD AUTO: 0.57 X10(3) UL (ref 0–0.7)
EOSINOPHIL NFR BLD AUTO: 5.6 %
ERYTHROCYTE [DISTWIDTH] IN BLOOD BY AUTOMATED COUNT: 13.8 %
GFR SERPLBLD BASED ON 1.73 SQ M-ARVRAT: 105 ML/MIN/1.73M2 (ref 60–?)
GLOBULIN PLAS-MCNC: 3.8 G/DL (ref 2.8–4.4)
GLUCOSE BLD-MCNC: 86 MG/DL (ref 70–99)
GLUCOSE UR STRIP.AUTO-MCNC: NEGATIVE MG/DL
HCT VFR BLD AUTO: 35.5 %
HGB BLD-MCNC: 11.3 G/DL
IMM GRANULOCYTES # BLD AUTO: 0.04 X10(3) UL (ref 0–1)
IMM GRANULOCYTES NFR BLD: 0.4 %
LIPASE SERPL-CCNC: 150 U/L (ref 73–393)
LIPASE SERPL-CCNC: 42 U/L (ref 13–75)
LYMPHOCYTES # BLD AUTO: 2.45 X10(3) UL (ref 1–4)
LYMPHOCYTES NFR BLD AUTO: 24.2 %
MCH RBC QN AUTO: 28 PG (ref 26–34)
MCHC RBC AUTO-ENTMCNC: 31.8 G/DL (ref 31–37)
MCV RBC AUTO: 88.1 FL
MONOCYTES # BLD AUTO: 0.75 X10(3) UL (ref 0.1–1)
MONOCYTES NFR BLD AUTO: 7.4 %
NEUTROPHILS # BLD AUTO: 6.29 X10 (3) UL (ref 1.5–7.7)
NEUTROPHILS # BLD AUTO: 6.29 X10(3) UL (ref 1.5–7.7)
NEUTROPHILS NFR BLD AUTO: 62 %
NITRITE UR QL STRIP.AUTO: NEGATIVE
OSMOLALITY SERPL CALC.SUM OF ELEC: 292 MOSM/KG (ref 275–295)
PH UR STRIP.AUTO: 5 [PH] (ref 5–8)
PLATELET # BLD AUTO: 287 10(3)UL (ref 150–450)
POTASSIUM SERPL-SCNC: 3.3 MMOL/L (ref 3.5–5.1)
PROT SERPL-MCNC: 7.6 G/DL (ref 6.4–8.2)
PROT UR STRIP.AUTO-MCNC: NEGATIVE MG/DL
RBC # BLD AUTO: 4.03 X10(6)UL
SODIUM SERPL-SCNC: 142 MMOL/L (ref 136–145)
SP GR UR STRIP.AUTO: 1.02 (ref 1–1.03)
UROBILINOGEN UR STRIP.AUTO-MCNC: <2 MG/DL
WBC # BLD AUTO: 10.1 X10(3) UL (ref 4–11)

## 2023-02-11 PROCEDURE — 99284 EMERGENCY DEPT VISIT MOD MDM: CPT

## 2023-02-11 PROCEDURE — 81001 URINALYSIS AUTO W/SCOPE: CPT | Performed by: EMERGENCY MEDICINE

## 2023-02-11 PROCEDURE — 87046 STOOL CULTR AEROBIC BACT EA: CPT | Performed by: EMERGENCY MEDICINE

## 2023-02-11 PROCEDURE — 96361 HYDRATE IV INFUSION ADD-ON: CPT

## 2023-02-11 PROCEDURE — 85025 COMPLETE CBC W/AUTO DIFF WBC: CPT | Performed by: EMERGENCY MEDICINE

## 2023-02-11 PROCEDURE — 87045 FECES CULTURE AEROBIC BACT: CPT | Performed by: EMERGENCY MEDICINE

## 2023-02-11 PROCEDURE — 87427 SHIGA-LIKE TOXIN AG IA: CPT | Performed by: EMERGENCY MEDICINE

## 2023-02-11 PROCEDURE — 87493 C DIFF AMPLIFIED PROBE: CPT | Performed by: EMERGENCY MEDICINE

## 2023-02-11 PROCEDURE — 87086 URINE CULTURE/COLONY COUNT: CPT | Performed by: EMERGENCY MEDICINE

## 2023-02-11 PROCEDURE — 83690 ASSAY OF LIPASE: CPT | Performed by: EMERGENCY MEDICINE

## 2023-02-11 PROCEDURE — 96374 THER/PROPH/DIAG INJ IV PUSH: CPT

## 2023-02-11 PROCEDURE — 96372 THER/PROPH/DIAG INJ SC/IM: CPT

## 2023-02-11 PROCEDURE — 80053 COMPREHEN METABOLIC PANEL: CPT | Performed by: EMERGENCY MEDICINE

## 2023-02-11 RX ORDER — MULTIVITAMIN
TABLET ORAL DAILY
COMMUNITY

## 2023-02-11 RX ORDER — DICYCLOMINE HYDROCHLORIDE 10 MG/ML
20 INJECTION INTRAMUSCULAR ONCE
Status: COMPLETED | OUTPATIENT
Start: 2023-02-11 | End: 2023-02-11

## 2023-02-11 RX ORDER — ONDANSETRON 4 MG/1
4 TABLET, ORALLY DISINTEGRATING ORAL EVERY 4 HOURS PRN
Qty: 10 TABLET | Refills: 0 | Status: SHIPPED | OUTPATIENT
Start: 2023-02-11 | End: 2023-02-18

## 2023-02-11 RX ORDER — ONDANSETRON 2 MG/ML
4 INJECTION INTRAMUSCULAR; INTRAVENOUS ONCE
Status: COMPLETED | OUTPATIENT
Start: 2023-02-11 | End: 2023-02-11

## 2023-02-11 NOTE — ED INITIAL ASSESSMENT (HPI)
PT c/o abdominal pain and diarrhea since Wednesday. PT reports body aches and chills and fever on Tuesday that has since subsided. PT seen at [facility] on Thursday and was told is probable rotavirus, and was told if not feeling better by Saturday to come to ER.

## 2023-03-07 NOTE — LETTER
DIABETES regimen as of  3/7/2023    take Carvediol dose when you get home  BP < 130/80     (*)  Continue Glipizide 10mg (TWO-5mg tablets) TWICE a DAY     (*) Continue Atorvastatin 20mg daily (cholesterol)    (*) Goal BG < 160's consistently     (*)Continue Lantus 48 units daily    (*) Start  Jardiance 10mg daily     Side effects of low blood pressure/dizziness, weight loss, hyperkalemia, increased risk for UTI or genital infections      (*) Labs in 1 month (fasting)     (*) Check your blood sugars at least 1-2 times a day       -Before breakfast everyday       -Either before lunch or dinner or at bedtime (rotate these times around)         -Anytime you feel \"low\"      (*) Call office ([phone]) if your blood sugar goes below 70 on more than one occasion within a short period of time, if you have any readings under 50, or if it is above 250 and not coming down.    (*) Bring in your blood sugar readings or glucose meter to every appointment     If you have not heard from my office regarding your results within 5 days, please contact [phone] or send eleni message.     (*) It is important to have a dilated eye exam every year.  You are due for your yearly dilated eye exam  -Please call [phone] to schedule with the Opthomologist       FOLLOW UP :  3 months        Additional Educational Resources:  For additional resources regarding your symptoms, diagnosis, or further health information, please visit the Health Resources section on [email or web address] or the Online Health Resources section in [email or web address].    Dear New MomClark, we missed you! The nurses of [facility] have tried to reach you by phone to ask if you have any questions regarding your health or the health and care of your new little one.    We hope you are doing well. If, for any reason, you have questions or concerns about your health or your baby’s health, please contact your provider or your pediatrician or family medicine physician regarding your baby.     At [facility], we feel that postpartum support is very important for new families. Please see the enclosed new parent support flyer that lists support programs and resources with both in-person and online options.     Additionally, our Breastfeeding Centers at [facility] and [facility] in [place], offer outpatient visits with our International Board-Certified Lactation   Consultants (IBCLCs) for any breastfeeding concerns or questions you may have.    For issues related to stress, anxiety or depression, we have a Nurturing Mom support group that meets both in-person or online.  There’s also a 24-hour Mom’s Line where you can request a phone call from a clinical therapist for assistance for postpartum depression.    We encourage you to take advantage of these programs and resources as you recover from childbirth and learn to care for your new infant.    Best wishes,    [facility] Nurses            [phone]

## 2023-06-23 LAB
ANTIBODY SCREEN OB: NEGATIVE
HEPATITIS B SURFACE ANTIGEN OB: NEGATIVE
HIV ANTIGEN-ANTIBODY QUAL: NONREACTIVE
RH FACTOR OB: POSITIVE
SYPHILIS-TOTAL QUAL: NONREACTIVE

## 2023-09-13 ENCOUNTER — OFFICE VISIT (OUTPATIENT)
Dept: PERINATAL CARE | Facility: HOSPITAL | Age: 38
End: 2023-09-13
Attending: OBSTETRICS & GYNECOLOGY
Payer: COMMERCIAL

## 2023-09-13 VITALS
SYSTOLIC BLOOD PRESSURE: 130 MMHG | DIASTOLIC BLOOD PRESSURE: 70 MMHG | HEART RATE: 103 BPM | HEIGHT: 63 IN | BODY MASS INDEX: 37.21 KG/M2 | WEIGHT: 210 LBS

## 2023-09-13 DIAGNOSIS — O99.210 OBESITY COMPLICATING PREGNANCY: ICD-10-CM

## 2023-09-13 DIAGNOSIS — Z87.59 HISTORY OF GESTATIONAL HYPERTENSION: ICD-10-CM

## 2023-09-13 DIAGNOSIS — O09.522 MULTIGRAVIDA OF ADVANCED MATERNAL AGE IN SECOND TRIMESTER: Primary | ICD-10-CM

## 2023-09-13 DIAGNOSIS — O99.212 OBESITY AFFECTING PREGNANCY IN SECOND TRIMESTER, UNSPECIFIED OBESITY TYPE: ICD-10-CM

## 2023-09-13 DIAGNOSIS — O09.522 MULTIGRAVIDA OF ADVANCED MATERNAL AGE IN SECOND TRIMESTER: ICD-10-CM

## 2023-09-13 PROCEDURE — 76811 OB US DETAILED SNGL FETUS: CPT | Performed by: OBSTETRICS & GYNECOLOGY

## 2023-09-13 RX ORDER — ASPIRIN 81 MG/1
40 TABLET ORAL DAILY
COMMUNITY

## 2023-09-13 RX ORDER — CHOLECALCIFEROL (VITAMIN D3) 25 MCG
1 TABLET,CHEWABLE ORAL DAILY
COMMUNITY

## 2023-10-04 ENCOUNTER — ULTRASOUND ENCOUNTER (OUTPATIENT)
Dept: PERINATAL CARE | Facility: HOSPITAL | Age: 38
End: 2023-10-04
Attending: OBSTETRICS & GYNECOLOGY
Payer: COMMERCIAL

## 2023-10-04 VITALS
SYSTOLIC BLOOD PRESSURE: 132 MMHG | WEIGHT: 212 LBS | HEIGHT: 63 IN | BODY MASS INDEX: 37.56 KG/M2 | DIASTOLIC BLOOD PRESSURE: 80 MMHG | HEART RATE: 92 BPM

## 2023-10-04 DIAGNOSIS — O99.210 OBESITY COMPLICATING PREGNANCY: ICD-10-CM

## 2023-10-04 DIAGNOSIS — O09.522 MULTIGRAVIDA OF ADVANCED MATERNAL AGE IN SECOND TRIMESTER: Primary | ICD-10-CM

## 2023-10-04 DIAGNOSIS — O99.212 OBESITY AFFECTING PREGNANCY IN SECOND TRIMESTER, UNSPECIFIED OBESITY TYPE: ICD-10-CM

## 2023-10-04 DIAGNOSIS — O09.522 MULTIGRAVIDA OF ADVANCED MATERNAL AGE IN SECOND TRIMESTER: ICD-10-CM

## 2023-10-04 PROCEDURE — 76815 OB US LIMITED FETUS(S): CPT | Performed by: OBSTETRICS & GYNECOLOGY

## 2023-12-20 LAB — HIV ANTIGEN-ANTIBODY QUAL: NONREACTIVE

## 2024-01-04 LAB — STREP GP B CULT OB: POSITIVE

## 2024-01-17 ENCOUNTER — TELEPHONE (OUTPATIENT)
Dept: OBGYN UNIT | Facility: HOSPITAL | Age: 39
End: 2024-01-17

## 2024-01-18 ENCOUNTER — TELEPHONE (OUTPATIENT)
Dept: OBGYN UNIT | Facility: HOSPITAL | Age: 39
End: 2024-01-18

## 2024-01-19 ENCOUNTER — TELEPHONE (OUTPATIENT)
Dept: OBGYN UNIT | Facility: HOSPITAL | Age: 39
End: 2024-01-19

## 2024-01-19 RX ORDER — FLUTICASONE PROPIONATE 50 MCG
1 SPRAY, SUSPENSION (ML) NASAL DAILY
COMMUNITY

## 2024-01-19 RX ORDER — MELATONIN
325
COMMUNITY

## 2024-01-19 NOTE — PROGRESS NOTES
01/19/24 1214   Call Complete   Call Complete Yes   Date Call Complete 01/19/24   Time Call Complete 1214   Call Comments Arrival instructions provided and all questions answered.

## 2024-01-24 ENCOUNTER — HOSPITAL ENCOUNTER (INPATIENT)
Facility: HOSPITAL | Age: 39
LOS: 3 days | Discharge: HOME | End: 2024-01-27
Attending: OBSTETRICS & GYNECOLOGY | Admitting: OBSTETRICS & GYNECOLOGY
Payer: COMMERCIAL

## 2024-01-24 ENCOUNTER — ANESTHESIA (OUTPATIENT)
Dept: OBGYN UNIT | Facility: HOSPITAL | Age: 39
End: 2024-01-24
Payer: COMMERCIAL

## 2024-01-24 ENCOUNTER — ANESTHESIA EVENT (OUTPATIENT)
Dept: OBGYN UNIT | Facility: HOSPITAL | Age: 39
End: 2024-01-24
Payer: COMMERCIAL

## 2024-01-24 DIAGNOSIS — Z98.891 S/P CESAREAN SECTION: ICD-10-CM

## 2024-01-24 LAB
ALBUMIN SERPL-MCNC: 3.2 G/DL (ref 3.4–5)
ALBUMIN/GLOB SERPL: 0.7 {RATIO} (ref 1–2)
ALP LIVER SERPL-CCNC: 163 U/L
ANION GAP SERPL CALC-SCNC: 7 MMOL/L (ref 0–18)
ANTIBODY SCREEN: NEGATIVE
AST SERPL-CCNC: 22 U/L (ref 15–37)
BASOPHILS # BLD AUTO: 0.04 X10(3) UL (ref 0–0.2)
BASOPHILS NFR BLD AUTO: 0.3 %
BILIRUB SERPL-MCNC: 0.5 MG/DL (ref 0.1–2)
BUN BLD-MCNC: 13 MG/DL (ref 9–23)
CALCIUM BLD-MCNC: 9.4 MG/DL (ref 8.5–10.1)
CHLORIDE SERPL-SCNC: 109 MMOL/L (ref 98–112)
CO2 SERPL-SCNC: 22 MMOL/L (ref 21–32)
CREAT BLD-MCNC: 0.6 MG/DL
CREAT UR-SCNC: 79.5 MG/DL
EGFRCR SERPLBLD CKD-EPI 2021: 119 ML/MIN/1.73M2 (ref 60–?)
EOSINOPHIL # BLD AUTO: 0.3 X10(3) UL (ref 0–0.7)
EOSINOPHIL NFR BLD AUTO: 1.9 %
ERYTHROCYTE [DISTWIDTH] IN BLOOD BY AUTOMATED COUNT: 13.7 %
GLOBULIN PLAS-MCNC: 4.3 G/DL (ref 2.8–4.4)
GLUCOSE BLD-MCNC: 81 MG/DL (ref 70–99)
GLUCOSE BLD-MCNC: 88 MG/DL (ref 70–99)
HCT VFR BLD AUTO: 38.4 %
HGB BLD-MCNC: 13 G/DL
IMM GRANULOCYTES # BLD AUTO: 0.1 X10(3) UL (ref 0–1)
IMM GRANULOCYTES NFR BLD: 0.6 %
LYMPHOCYTES # BLD AUTO: 2.7 X10(3) UL (ref 1–4)
LYMPHOCYTES NFR BLD AUTO: 16.9 %
MCH RBC QN AUTO: 31.8 PG (ref 26–34)
MCHC RBC AUTO-ENTMCNC: 33.9 G/DL (ref 31–37)
MCV RBC AUTO: 93.9 FL
MONOCYTES # BLD AUTO: 0.99 X10(3) UL (ref 0.1–1)
MONOCYTES NFR BLD AUTO: 6.2 %
NEUTROPHILS # BLD AUTO: 11.8 X10 (3) UL (ref 1.5–7.7)
NEUTROPHILS # BLD AUTO: 11.8 X10(3) UL (ref 1.5–7.7)
NEUTROPHILS NFR BLD AUTO: 74.1 %
OSMOLALITY SERPL CALC.SUM OF ELEC: 285 MOSM/KG (ref 275–295)
PLATELET # BLD AUTO: 229 10(3)UL (ref 150–450)
POTASSIUM SERPL-SCNC: 4.3 MMOL/L (ref 3.5–5.1)
PROT SERPL-MCNC: 7.5 G/DL (ref 6.4–8.2)
PROT UR-MCNC: 16.8 MG/DL
PROT/CREAT UR-RTO: 0.21
RBC # BLD AUTO: 4.09 X10(6)UL
RH BLOOD TYPE: POSITIVE
SODIUM SERPL-SCNC: 138 MMOL/L (ref 136–145)
T PALLIDUM AB SER QL IA: NONREACTIVE
URATE SERPL-MCNC: 4.5 MG/DL
WBC # BLD AUTO: 15.9 X10(3) UL (ref 4–11)

## 2024-01-24 PROCEDURE — 86901 BLOOD TYPING SEROLOGIC RH(D): CPT | Performed by: OBSTETRICS & GYNECOLOGY

## 2024-01-24 PROCEDURE — 86780 TREPONEMA PALLIDUM: CPT | Performed by: OBSTETRICS & GYNECOLOGY

## 2024-01-24 PROCEDURE — 80053 COMPREHEN METABOLIC PANEL: CPT | Performed by: OBSTETRICS & GYNECOLOGY

## 2024-01-24 PROCEDURE — 84156 ASSAY OF PROTEIN URINE: CPT | Performed by: OBSTETRICS & GYNECOLOGY

## 2024-01-24 PROCEDURE — 82570 ASSAY OF URINE CREATININE: CPT | Performed by: OBSTETRICS & GYNECOLOGY

## 2024-01-24 PROCEDURE — 85025 COMPLETE CBC W/AUTO DIFF WBC: CPT | Performed by: OBSTETRICS & GYNECOLOGY

## 2024-01-24 PROCEDURE — 86850 RBC ANTIBODY SCREEN: CPT | Performed by: OBSTETRICS & GYNECOLOGY

## 2024-01-24 PROCEDURE — 82962 GLUCOSE BLOOD TEST: CPT

## 2024-01-24 PROCEDURE — 84550 ASSAY OF BLOOD/URIC ACID: CPT | Performed by: OBSTETRICS & GYNECOLOGY

## 2024-01-24 PROCEDURE — 86900 BLOOD TYPING SEROLOGIC ABO: CPT | Performed by: OBSTETRICS & GYNECOLOGY

## 2024-01-24 RX ORDER — NICOTINE POLACRILEX 4 MG
30 LOZENGE BUCCAL
Status: DISCONTINUED | OUTPATIENT
Start: 2024-01-24 | End: 2024-01-25 | Stop reason: HOSPADM

## 2024-01-24 RX ORDER — CEFAZOLIN SODIUM/WATER 2 G/20 ML
2 SYRINGE (ML) INTRAVENOUS ONCE
Status: COMPLETED | OUTPATIENT
Start: 2024-01-24 | End: 2024-01-24

## 2024-01-24 RX ORDER — SODIUM CHLORIDE, SODIUM LACTATE, POTASSIUM CHLORIDE, CALCIUM CHLORIDE 600; 310; 30; 20 MG/100ML; MG/100ML; MG/100ML; MG/100ML
INJECTION, SOLUTION INTRAVENOUS CONTINUOUS
Status: DISCONTINUED | OUTPATIENT
Start: 2024-01-24 | End: 2024-01-27

## 2024-01-24 RX ORDER — NALBUPHINE HYDROCHLORIDE 10 MG/ML
2.5 INJECTION, SOLUTION INTRAMUSCULAR; INTRAVENOUS; SUBCUTANEOUS
Status: DISCONTINUED | OUTPATIENT
Start: 2024-01-24 | End: 2024-01-25 | Stop reason: HOSPADM

## 2024-01-24 RX ORDER — ONDANSETRON 2 MG/ML
4 INJECTION INTRAMUSCULAR; INTRAVENOUS EVERY 6 HOURS PRN
Status: DISCONTINUED | OUTPATIENT
Start: 2024-01-24 | End: 2024-01-25 | Stop reason: HOSPADM

## 2024-01-24 RX ORDER — MORPHINE SULFATE 2 MG/ML
INJECTION, SOLUTION INTRAMUSCULAR; INTRAVENOUS AS NEEDED
Status: DISCONTINUED | OUTPATIENT
Start: 2024-01-24 | End: 2024-01-24 | Stop reason: SURG

## 2024-01-24 RX ORDER — DEXTROSE MONOHYDRATE 25 G/50ML
50 INJECTION, SOLUTION INTRAVENOUS
Status: DISCONTINUED | OUTPATIENT
Start: 2024-01-24 | End: 2024-01-25 | Stop reason: HOSPADM

## 2024-01-24 RX ORDER — SODIUM CHLORIDE, SODIUM LACTATE, POTASSIUM CHLORIDE, CALCIUM CHLORIDE 600; 310; 30; 20 MG/100ML; MG/100ML; MG/100ML; MG/100ML
125 INJECTION, SOLUTION INTRAVENOUS CONTINUOUS
Status: DISCONTINUED | OUTPATIENT
Start: 2024-01-24 | End: 2024-01-25 | Stop reason: HOSPADM

## 2024-01-24 RX ORDER — PHENYLEPHRINE HCL 10 MG/ML
VIAL (ML) INJECTION AS NEEDED
Status: DISCONTINUED | OUTPATIENT
Start: 2024-01-24 | End: 2024-01-24 | Stop reason: SURG

## 2024-01-24 RX ORDER — NICOTINE POLACRILEX 4 MG
15 LOZENGE BUCCAL
Status: DISCONTINUED | OUTPATIENT
Start: 2024-01-24 | End: 2024-01-25 | Stop reason: HOSPADM

## 2024-01-24 RX ORDER — DEXAMETHASONE SODIUM PHOSPHATE 4 MG/ML
VIAL (ML) INJECTION AS NEEDED
Status: DISCONTINUED | OUTPATIENT
Start: 2024-01-24 | End: 2024-01-24 | Stop reason: SURG

## 2024-01-24 RX ORDER — CITRIC ACID/SODIUM CITRATE 334-500MG
30 SOLUTION, ORAL ORAL ONCE
Status: COMPLETED | OUTPATIENT
Start: 2024-01-24 | End: 2024-01-24

## 2024-01-24 RX ORDER — ONDANSETRON 2 MG/ML
INJECTION INTRAMUSCULAR; INTRAVENOUS AS NEEDED
Status: DISCONTINUED | OUTPATIENT
Start: 2024-01-24 | End: 2024-01-24 | Stop reason: SURG

## 2024-01-24 RX ORDER — KETOROLAC TROMETHAMINE 30 MG/ML
30 INJECTION, SOLUTION INTRAMUSCULAR; INTRAVENOUS ONCE
Status: DISCONTINUED | OUTPATIENT
Start: 2024-01-24 | End: 2024-01-25 | Stop reason: HOSPADM

## 2024-01-24 RX ORDER — ACETAMINOPHEN 500 MG
1000 TABLET ORAL ONCE
Status: COMPLETED | OUTPATIENT
Start: 2024-01-24 | End: 2024-01-24

## 2024-01-24 RX ORDER — ONDANSETRON 2 MG/ML
4 INJECTION INTRAMUSCULAR; INTRAVENOUS ONCE AS NEEDED
Status: ACTIVE | OUTPATIENT
Start: 2024-01-24 | End: 2024-01-24

## 2024-01-24 RX ORDER — LIDOCAINE HYDROCHLORIDE 10 MG/ML
INJECTION, SOLUTION EPIDURAL; INFILTRATION; INTRACAUDAL; PERINEURAL AS NEEDED
Status: DISCONTINUED | OUTPATIENT
Start: 2024-01-24 | End: 2024-01-24 | Stop reason: SURG

## 2024-01-24 RX ORDER — BUPIVACAINE HYDROCHLORIDE 7.5 MG/ML
INJECTION, SOLUTION INTRASPINAL AS NEEDED
Status: DISCONTINUED | OUTPATIENT
Start: 2024-01-24 | End: 2024-01-24 | Stop reason: SURG

## 2024-01-24 RX ADMIN — SODIUM CHLORIDE, SODIUM LACTATE, POTASSIUM CHLORIDE, CALCIUM CHLORIDE: 600; 310; 30; 20 INJECTION, SOLUTION INTRAVENOUS at 22:08:00

## 2024-01-24 RX ADMIN — PHENYLEPHRINE HCL 100 MCG: 10 MG/ML VIAL (ML) INJECTION at 21:45:00

## 2024-01-24 RX ADMIN — PHENYLEPHRINE HCL 200 MCG: 10 MG/ML VIAL (ML) INJECTION at 21:40:00

## 2024-01-24 RX ADMIN — DEXAMETHASONE SODIUM PHOSPHATE 4 MG: 4 MG/ML VIAL (ML) INJECTION at 21:41:00

## 2024-01-24 RX ADMIN — PHENYLEPHRINE HCL 100 MCG: 10 MG/ML VIAL (ML) INJECTION at 21:43:00

## 2024-01-24 RX ADMIN — ONDANSETRON 4 MG: 2 INJECTION INTRAMUSCULAR; INTRAVENOUS at 21:41:00

## 2024-01-24 RX ADMIN — PHENYLEPHRINE HCL 100 MCG: 10 MG/ML VIAL (ML) INJECTION at 21:59:00

## 2024-01-24 RX ADMIN — MORPHINE SULFATE 0.2 MG: 2 INJECTION, SOLUTION INTRAMUSCULAR; INTRAVENOUS at 21:38:00

## 2024-01-24 RX ADMIN — BUPIVACAINE HYDROCHLORIDE 1.6 ML: 7.5 INJECTION, SOLUTION INTRASPINAL at 21:38:00

## 2024-01-24 RX ADMIN — CEFAZOLIN SODIUM/WATER 2 G: 2 G/20 ML SYRINGE (ML) INTRAVENOUS at 21:41:00

## 2024-01-24 RX ADMIN — LIDOCAINE HYDROCHLORIDE 3 ML: 10 INJECTION, SOLUTION EPIDURAL; INFILTRATION; INTRACAUDAL; PERINEURAL at 21:36:00

## 2024-01-24 RX ADMIN — SODIUM CHLORIDE, SODIUM LACTATE, POTASSIUM CHLORIDE, CALCIUM CHLORIDE: 600; 310; 30; 20 INJECTION, SOLUTION INTRAVENOUS at 21:33:00

## 2024-01-25 LAB
BASOPHILS # BLD AUTO: 0.05 X10(3) UL (ref 0–0.2)
BASOPHILS NFR BLD AUTO: 0.3 %
EOSINOPHIL # BLD AUTO: 0.06 X10(3) UL (ref 0–0.7)
EOSINOPHIL NFR BLD AUTO: 0.3 %
ERYTHROCYTE [DISTWIDTH] IN BLOOD BY AUTOMATED COUNT: 13.9 %
HCT VFR BLD AUTO: 32.1 %
HGB BLD-MCNC: 10.7 G/DL
IMM GRANULOCYTES # BLD AUTO: 0.1 X10(3) UL (ref 0–1)
IMM GRANULOCYTES NFR BLD: 0.6 %
LYMPHOCYTES # BLD AUTO: 2.22 X10(3) UL (ref 1–4)
LYMPHOCYTES NFR BLD AUTO: 12.4 %
MCH RBC QN AUTO: 31.8 PG (ref 26–34)
MCHC RBC AUTO-ENTMCNC: 33.3 G/DL (ref 31–37)
MCV RBC AUTO: 95.3 FL
MONOCYTES # BLD AUTO: 1.06 X10(3) UL (ref 0.1–1)
MONOCYTES NFR BLD AUTO: 5.9 %
NEUTROPHILS # BLD AUTO: 14.42 X10 (3) UL (ref 1.5–7.7)
NEUTROPHILS # BLD AUTO: 14.42 X10(3) UL (ref 1.5–7.7)
NEUTROPHILS NFR BLD AUTO: 80.5 %
PLATELET # BLD AUTO: 192 10(3)UL (ref 150–450)
RBC # BLD AUTO: 3.37 X10(6)UL
WBC # BLD AUTO: 17.9 X10(3) UL (ref 4–11)

## 2024-01-25 PROCEDURE — 85025 COMPLETE CBC W/AUTO DIFF WBC: CPT | Performed by: OBSTETRICS & GYNECOLOGY

## 2024-01-25 PROCEDURE — 88307 TISSUE EXAM BY PATHOLOGIST: CPT | Performed by: OBSTETRICS & GYNECOLOGY

## 2024-01-25 RX ORDER — FLUTICASONE PROPIONATE 50 MCG
1 SPRAY, SUSPENSION (ML) NASAL DAILY
Status: DISCONTINUED | OUTPATIENT
Start: 2024-01-25 | End: 2024-01-27

## 2024-01-25 RX ORDER — BISACODYL 10 MG
10 SUPPOSITORY, RECTAL RECTAL ONCE AS NEEDED
Status: DISCONTINUED | OUTPATIENT
Start: 2024-01-25 | End: 2024-01-27

## 2024-01-25 RX ORDER — SIMETHICONE 80 MG
80 TABLET,CHEWABLE ORAL 3 TIMES DAILY PRN
Status: DISCONTINUED | OUTPATIENT
Start: 2024-01-25 | End: 2024-01-27

## 2024-01-25 RX ORDER — ONDANSETRON 2 MG/ML
4 INJECTION INTRAMUSCULAR; INTRAVENOUS EVERY 6 HOURS PRN
Status: DISCONTINUED | OUTPATIENT
Start: 2024-01-25 | End: 2024-01-27

## 2024-01-25 RX ORDER — ACETAMINOPHEN 500 MG
1000 TABLET ORAL EVERY 6 HOURS
Status: DISCONTINUED | OUTPATIENT
Start: 2024-01-25 | End: 2024-01-27

## 2024-01-25 RX ORDER — KETOROLAC TROMETHAMINE 30 MG/ML
30 INJECTION, SOLUTION INTRAMUSCULAR; INTRAVENOUS EVERY 6 HOURS
Status: COMPLETED | OUTPATIENT
Start: 2024-01-25 | End: 2024-01-25

## 2024-01-25 RX ORDER — DEXTROSE, SODIUM CHLORIDE, SODIUM LACTATE, POTASSIUM CHLORIDE, AND CALCIUM CHLORIDE 5; .6; .31; .03; .02 G/100ML; G/100ML; G/100ML; G/100ML; G/100ML
INJECTION, SOLUTION INTRAVENOUS CONTINUOUS PRN
Status: DISCONTINUED | OUTPATIENT
Start: 2024-01-25 | End: 2024-01-27

## 2024-01-25 RX ORDER — IBUPROFEN 600 MG/1
600 TABLET ORAL EVERY 6 HOURS
Status: DISCONTINUED | OUTPATIENT
Start: 2024-01-25 | End: 2024-01-27

## 2024-01-25 RX ORDER — GABAPENTIN 300 MG/1
300 CAPSULE ORAL EVERY 8 HOURS PRN
Status: DISCONTINUED | OUTPATIENT
Start: 2024-01-25 | End: 2024-01-27

## 2024-01-25 RX ORDER — DOCUSATE SODIUM 100 MG/1
100 CAPSULE, LIQUID FILLED ORAL
Status: DISCONTINUED | OUTPATIENT
Start: 2024-01-25 | End: 2024-01-27

## 2024-01-25 RX ORDER — ENOXAPARIN SODIUM 100 MG/ML
40 INJECTION SUBCUTANEOUS DAILY
Status: DISCONTINUED | OUTPATIENT
Start: 2024-01-25 | End: 2024-01-27

## 2024-01-25 RX ORDER — HYDROMORPHONE HYDROCHLORIDE 1 MG/ML
0.3 INJECTION, SOLUTION INTRAMUSCULAR; INTRAVENOUS; SUBCUTANEOUS EVERY 2 HOUR PRN
Status: DISCONTINUED | OUTPATIENT
Start: 2024-01-25 | End: 2024-01-27

## 2024-01-25 RX ORDER — OXYCODONE HYDROCHLORIDE 5 MG/1
5 TABLET ORAL EVERY 6 HOURS PRN
Status: DISCONTINUED | OUTPATIENT
Start: 2024-01-25 | End: 2024-01-27

## 2024-01-25 NOTE — PROGRESS NOTES
Pt is a 38 year old female admitted to TRG2/TRG2-A.  Pt is  39w0d intra-uterine pregnancy.    Chief Complaint   Patient presents with    Scheduled Induction      Pt presents to L&D triage from OB office for RCS d/t gHTN and oligohydramnios. Pt denies any uterine activity, leaking of fluid, or vaginal bleeding. EFM explained and applied, abdomen palpating soft, non-tender.     History obtained, oriented to room, staff, and plan of care. All questions answered. Pt's  @ BS.

## 2024-01-25 NOTE — ANESTHESIA PROCEDURE NOTES
Spinal Block    Date/Time: 1/24/2024 9:35 PM    Performed by: Brenton Connor MD  Authorized by: Brenton Connor MD      General Information and Staff    Start Time:  1/24/2024 9:35 PM  End Time:  1/24/2024 9:38 PM  Anesthesiologist:  Brenton Connor MD  Performed by:  Anesthesiologist  Patient Location:  OB  Site identification: surface landmarks    Preanesthetic Checklist: patient identified, IV checked, risks and benefits discussed, monitors and equipment checked, pre-op evaluation, timeout performed, anesthesia consent and sterile technique used      Procedure Details    Patient Position:  Sitting  Prep: ChloraPrep    Monitoring:  Cardiac monitor, heart rate and continuous pulse ox  Approach:  Midline  Location:  L3-4  Injection Technique:  Single-shot    Needle    Needle Type:  Sprotte  Needle Gauge:  24 G  Needle Length:  3.5 in    Assessment    Sensory Level:  T8  Events: clear CSF, CSF aspirated, well tolerated and blood negative      Additional Comments

## 2024-01-25 NOTE — PLAN OF CARE
Problem: Patient/Family Goals  Goal: Patient/Family Long Term Goal  Description: Patient's Long Term Goal: uncomplicated  delivery    Interventions:  -   - See additional Care Plan goals for specific interventions  2024 by Yvette Garzon RN  Outcome: Completed  2024 by Yvette Garzon RN  Outcome: Progressing  Goal: Patient/Family Short Term Goal  Description: Patient's Short Term Goal: effective pain, anxiety, and BP management    Interventions:   -   - See additional Care Plan goals for specific interventions  2024 by Yvette Garzon RN  Outcome: Completed  2024 by Yvette Garzon RN  Outcome: Progressing     Problem: BIRTH - VAGINAL/ SECTION  Goal: Fetal and maternal status remain reassuring during the birth process  Description: INTERVENTIONS:  - Monitor vital signs  - Monitor fetal heart rate  - Monitor uterine activity  - Monitor labor progression (vaginal delivery)  - DVT prophylaxis (C/S delivery)  - Surgical antibiotic prophylaxis (C/S delivery)  2024 by Yvette Garzon RN  Outcome: Completed  2024 by Yvette Garzon RN  Outcome: Progressing     Problem: PAIN - ADULT  Goal: Verbalizes/displays adequate comfort level or patient's stated pain goal  Description: INTERVENTIONS:  - Encourage pt to monitor pain and request assistance  - Assess pain using appropriate pain scale  - Administer analgesics based on type and severity of pain and evaluate response  - Implement non-pharmacological measures as appropriate and evaluate response  - Consider cultural and social influences on pain and pain management  - Manage/alleviate anxiety  - Utilize distraction and/or relaxation techniques  - Monitor for opioid side effects  - Notify MD/LIP if interventions unsuccessful or patient reports new pain  - Anticipate increased pain with activity and pre-medicate as appropriate  2024 by Yvette Garzon RN  Outcome: Completed  2024  1940 by Yvette Garzon, RN  Outcome: Progressing     Problem: ANXIETY  Goal: Will report anxiety at manageable levels  Description: INTERVENTIONS:  - Administer medication as ordered  - Teach and rehearse alternative coping skills  - Provide emotional support with 1:1 interaction with staff  1/25/2024 0020 by Yvette Garzon, RN  Outcome: Completed  1/24/2024 1940 by Yvette Garzon, RN  Outcome: Progressing

## 2024-01-25 NOTE — PLAN OF CARE
Problem: Patient/Family Goals  Goal: Patient/Family Long Term Goal  Description: Patient's Long Term Goal: uncomplicated  delivery    Interventions:  -   - See additional Care Plan goals for specific interventions  Outcome: Progressing  Goal: Patient/Family Short Term Goal  Description: Patient's Short Term Goal: effective pain, anxiety, and BP management    Interventions:   -   - See additional Care Plan goals for specific interventions  Outcome: Progressing     Problem: BIRTH - VAGINAL/ SECTION  Goal: Fetal and maternal status remain reassuring during the birth process  Description: INTERVENTIONS:  - Monitor vital signs  - Monitor fetal heart rate  - Monitor uterine activity  - Monitor labor progression (vaginal delivery)  - DVT prophylaxis (C/S delivery)  - Surgical antibiotic prophylaxis (C/S delivery)  Outcome: Progressing     Problem: PAIN - ADULT  Goal: Verbalizes/displays adequate comfort level or patient's stated pain goal  Description: INTERVENTIONS:  - Encourage pt to monitor pain and request assistance  - Assess pain using appropriate pain scale  - Administer analgesics based on type and severity of pain and evaluate response  - Implement non-pharmacological measures as appropriate and evaluate response  - Consider cultural and social influences on pain and pain management  - Manage/alleviate anxiety  - Utilize distraction and/or relaxation techniques  - Monitor for opioid side effects  - Notify MD/LIP if interventions unsuccessful or patient reports new pain  - Anticipate increased pain with activity and pre-medicate as appropriate  Outcome: Progressing     Problem: ANXIETY  Goal: Will report anxiety at manageable levels  Description: INTERVENTIONS:  - Administer medication as ordered  - Teach and rehearse alternative coping skills  - Provide emotional support with 1:1 interaction with staff  Outcome: Progressing

## 2024-01-25 NOTE — ANESTHESIA PREPROCEDURE EVALUATION
PRE-OP EVALUATION    Patient Name: Holli Whitlock-Suni    Admit Diagnosis: Pregnancy    Pre-op Diagnosis: * No pre-op diagnosis entered *     SECTION    Anesthesia Procedure:  SECTION    Surgeon(s) and Role:     * Estela Dockery MD - Primary     * Mayuri Mittal MD - Assisting Surgeon    Pre-op vitals reviewed.  Temp: 98.7 °F (37.1 °C)  Pulse: 89  Resp: 20  BP: 132/78     Body mass index is 38.09 kg/m².    Current medications reviewed.  Hospital Medications:   lactated ringers IV bolus 1,000 mL  1,000 mL Intravenous Once    Followed by    lactated ringers infusion  125 mL/hr Intravenous Continuous    acetaminophen (Tylenol Extra Strength) tab 1,000 mg  1,000 mg Oral Once    ondansetron (Zofran) 4 MG/2ML injection 4 mg  4 mg Intravenous Q6H PRN    sodium citrate-citric acid (Bicitra) 500-334 MG/5ML oral solution 30 mL  30 mL Oral Once    oxyTOCIN in sodium chloride 0.9% (Pitocin) 30 Units/500mL infusion premix  62.5-900 ele-units/min Intravenous Continuous    ceFAZolin (Ancef) 2 g in 20mL IV syringe premix  2 g Intravenous Once       Outpatient Medications:     Medications Prior to Admission   Medication Sig Dispense Refill Last Dose    ferrous sulfate 325 (65 FE) MG Oral Tab EC Take 1 tablet (325 mg total) by mouth daily with breakfast.   2024    fluticasone propionate 50 MCG/ACT Nasal Suspension 1 spray by Each Nare route daily.   2024    prenatal vitamin with DHA 27-0.8-228 MG Oral Cap Take 1 capsule by mouth daily.   2024    aspirin 81 MG Oral Tab EC Take 1 tablet (81 mg total) by mouth daily. Pt taking alternating 81 mg/ 162 mg every other day.   2024    Multiple Vitamin (MULTI-VITAMIN DAILY) Oral Tab Take by mouth daily.          Allergies: Adhesive tape      Anesthesia Evaluation    Patient summary reviewed.    Anesthetic Complications           GI/Hepatic/Renal                                 Cardiovascular                (+) obesity  (+) hypertension                                      Endo/Other      (+) diabetes  gestational,                          Pulmonary                           Neuro/Psych                                      Past Surgical History:   Procedure Laterality Date          x1    OTHER SURGICAL HISTORY  2019    LEEP    WISDOM TEETH REMOVED  2010     Social History     Socioeconomic History    Marital status:    Tobacco Use    Smoking status: Never    Smokeless tobacco: Never   Vaping Use    Vaping Use: Never used   Substance and Sexual Activity    Alcohol use: Not Currently     Comment: social, 2-3 per week    Drug use: No    Sexual activity: Yes     Partners: Male     Birth control/protection: Pill     History   Drug Use No     Available pre-op labs reviewed.  Lab Results   Component Value Date    WBC 15.9 (H) 2024    RBC 4.09 2024    HGB 13.0 2024    HCT 38.4 2024    MCV 93.9 2024    MCH 31.8 2024    MCHC 33.9 2024    RDW 13.7 2024    .0 2024     Lab Results   Component Value Date     2024    K 4.3 2024     2024    CO2 22.0 2024    BUN 13 2024    CREATSERUM 0.60 2024    GLU 81 2024    CA 9.4 2024            Airway      Mallampati: II  Mouth opening: 3 FB  TM distance: 4 - 6 cm  Neck ROM: full Cardiovascular    Cardiovascular exam normal.         Dental    Dentition appears grossly intact         Pulmonary    Pulmonary exam normal.                 Other findings              ASA: 2   Plan: spinal  NPO status verified and patient meets guidelines.          Plan/risks discussed with: patient and spouse  Use of blood product(s) discussed with: patient    Consented to blood products.          Present on Admission:  **None**

## 2024-01-25 NOTE — H&P
[facility]  History & Physical    Holli Rosa Patient Status:  Inpatient    10/8/1985 MRN [id number]   Location [facility] LABOR & DELIVERY Attending Estela Dockery MD   Hosp Day # 0 PCP Margot Zazueta DO     SUBJECTIVE:    Holli Rosa is a 38 year old  at 39+0 weeks here with elevated blood pressure and oligohydramnios. AKASH in the office today was 3. Blood pressure in the office 162/82, prior to that were 120-130s/80s. No HA or vision changes.    Prenatal issues:  1) H/o  for fetal intolerance to labor  2) H/o gestational HTN (severe) at 38 weeks  3) GBS positive  4) H/o LEEP 2019  5) AMA  6) GDM A1    Obstetric History:   OB History    Para Term  AB Living   2 1 1 0 0 1   SAB IAB Ectopic Multiple Live Births   0 0 0 0 1      # Outcome Date GA Lbr Suleiman/2nd Weight Sex Delivery Anes PTL Lv   2 Current            1 Term 21 38w2d  6 lb 3.5 oz (2.82 kg) M Caesarean Spinal N DEIRDRE      Complications: Fetal intolerance to labor     Past Medical History:   Past Medical History:   Diagnosis Date    BMI 35.0-35.9,adult 2021    Cervical dysplasia     Gestational diabetes     diet controlled    Obesity     Pregnancy-induced hypertension     Seasonal allergies     Visual impairment      Past Social History:   Past Surgical History:   Procedure Laterality Date          x1    OTHER SURGICAL HISTORY  2019    LEEP    WISDOM TEETH REMOVED       Family History:   Family History   Problem Relation Age of Onset    Psychiatric Maternal Grandmother         depression    Diabetes Maternal Grandmother     Lipids Maternal Grandmother     Hypertension Maternal Grandmother     Depression Maternal Grandmother     Cancer Maternal Grandfather         neck - tobacco/unsknown. in face and neck per patient    Lipids Maternal Grandfather     Hypertension Maternal Grandfather     Diabetes Maternal Grandfather     Other (arteriosclerosis) Maternal  Grandfather     No Known Problems Father     No Known Problems Mother     Heart Disorder Paternal Grandmother 85        MI    Glaucoma Paternal Grandfather     Obesity Neg      Social History:   Social History     Tobacco Use    Smoking status: Never    Smokeless tobacco: Never   Substance Use Topics    Alcohol use: Not Currently     Comment: social, 2-3 per week       Home Meds:   Medications Prior to Admission   Medication Sig Dispense Refill Last Dose    ferrous sulfate 325 (65 FE) MG Oral Tab EC Take 1 tablet (325 mg total) by mouth daily with breakfast.   2024    fluticasone propionate 50 MCG/ACT Nasal Suspension 1 spray by Each Nare route daily.   2024    prenatal vitamin with DHA 27-0.8-228 MG Oral Cap Take 1 capsule by mouth daily.   2024    aspirin 81 MG Oral Tab EC Take 1 tablet (81 mg total) by mouth daily. Pt taking alternating 81 mg/ 162 mg every other day.   2024    Multiple Vitamin (MULTI-VITAMIN DAILY) Oral Tab Take by mouth daily.        Allergies:   Allergies   Allergen Reactions    Adhesive Tape RASH       OBJECTIVE:    Temp:  [98.4 °F (36.9 °C)-98.7 °F (37.1 °C)] 98.4 °F (36.9 °C)  Pulse:  [89] 89  Resp:  [20] 20  BP: (132-142)/(78-80) 142/80    Lungs:  Unlabored breathing, no wheezing     Heart:   regular rate and rhythm, regular rate and rhythm, S1, S2 normal, no murmur, click, rub or gallop   Abdomen: Soft NT ND   Fetal Surveillance:  140 BPM   Mod karen, +accels, no decels      Cervix: not digitally examined     Lab Review:  A, Rh+, Rubella-immune, Hepatitis B surface antigen non-reactive, GBS positive  PCR 0.2  Recent Labs   Lab 24  1748   RBC 4.09   HGB 13.0   HCT 38.4   MCV 93.9   MCH 31.8   MCHC 33.9   RDW 13.7   NEPRELIM 11.80*   WBC 15.9*   .0     Recent Labs   Lab 24  1748   GLU 81   BUN 13   CREATSERUM 0.60   EGFRCR 119   CA 9.4   ALB 3.2*      K 4.3      CO2 22.0   ALKPHO 163*   AST 22   BILT 0.5   TP 7.5        ASSESSMENT:   at  39+0 weeks here with oligohydramnios and gestational HTN, history of , delivery is indicated via repeat .    PLAN:  1) FHT category I  2) RCS  the risks of repeat  were reviewed including risk of infection, bleeding, and increased risk of damage to surrounding structures like bowel, bladder, tubes, and ovaries. Questions were answered, and she gave informed consent.  3) Anesthesia saw patient, NPO, IV fluids, labs done    Risks, benefits, alternatives and possible complications have been discussed in detail with the patient.  Pre-admission, admission, and post admission procedures and expectations were discussed in detail.  All questions answered, all appropriate consents will be signed at the Hospital. Admission is planned for today.   Continue present management..    Estela Dockery MD  2024  9:35 PM

## 2024-01-25 NOTE — OPERATIVE REPORT
[facility]    Holli HOPE Sut[place]-Suni Patient Status:  Inpatient    10/8/1985 MRN [id number]   Location [facility] LABOR & DELIVERY Attending Estela Dockery MD   Hosp Day # 0 PCP Margot Zazueta DO     Date of procedure: 24    Preoperative diagnosis:   1) Oligohydramnios  2) Gestational HTN    Postoperative diagnosis:  The same    Procedure: Repeat Low Transverse  Delivery via Pfannensteil Skin Incision    Surgeon: Dr. Estela Dockery  Assistant: Dr. Alanna Mittal    A skilled physician assistant was medically necessary for this procedure. The assistant provided retraction and visualization throughout the procedure and assisted with delivery of the infant.    Anesthesia: spinal  Fluids: 1000mL LR  EBL: 600 mL  UOP: see MAR  Antibiotics: 2g ancef  DVT prophylaxis: SCDs    Findings:  1) Live born female infant, apgars 8/9, weight 7#  2) Otherwise normal appearing uterus, tubes, and ovaries. 4cm subserosal fibroid    Complications: none    Specimens: placenta (oligo)    Indication for procedure: This is a  at 39+0 weeks here with gestational hypertension and oligohydramnios. Delivery was recommended via repeat . The risks of the procedure were reviewed, and she gave informed consent.    Procedure. The patient was taken to the operating room and placed under spinal anesthesia. She was prepped and draped in supine position. Her anesthesia was tested and found to be adequate. A Pfannensteil incision was made with the scalpel and extended to the underlying fascia with the scalpel. The fascia was incised with the scalpel, and the incision was extended horizontally. The muscles were  in the midline bluntly, and the peritoneum was entered bluntly.    The bladder blade was inserted. A low transverse uterine incision was made with the scalpel and extended bluntly. The infant's head was delivered, followed by the body. The infant was handed to the neonatologist. Cord blood  was collected. The placenta was delivered manually.    The Marky was inserted. The uterine incision was then repaired with 0 vicryl in running fashion. A bleeding area on the left was oversewn with 0 vicryl in a figure of 8. It was hemostatic afterwards. The Marky was removed.    The gutters were then cleared of all clots. On repeat examination of the hysterotomy, good hemostasis was noted. The fascia was closed with 0 vicryl in running fashion. The subcutaneous tissue was irrigated and closed with 3-0 plain gut in interrupted fashion. The skin was closed with 4-0 monocryl in subcuticular fashion. Dressing was dermabond (tape allergy).    She was then taken to the recovery room in good condition. She tolerated the procedure well.    Rose Mary Rosa [[id number]]      Labor Events     labor?: No   steroids?: None  Antibiotics received during labor?: Yes  Antibiotics (enter # doses in comment): cefazolin  Rupture date/time: 20245     Rupture type: AROM  Intrapartum & labor complications: Oligohydramnios, Gestational hypertension       [place] Presentation    Presentation: Vertex       Operative Delivery    Operative Vaginal Delivery: N/A                      Shoulder Dystocia    Shoulder Dystocia: N/A             Anesthesia    Method: Spinal               Delivery      Delivery date/time:  24 21:55:31   Delivery type: Caesarean Section    Details:  Trial of labor after : No    categorization: Repeat    priority: unscheduled   Indications for : Prior Uterine Surgery   Skin incision type: 1 Pfannenstiel   Uterine Incision type: Low Transverse      Delivery location: OR       Delivery Providers    Delivering Clinician: Estela Dockery MD   Delivery personnel:  Provider Role   Rox Groves RN Baby Nurse   Yvette Garzon RN Delivery Nurse   Covert, MD Aguilar Neonatologist             Cord    Vessels: 3 Vessels  Complications:  None  Timed cord clamping: Yes  Cord blood disposition: to lab  Gases sent?: No       Resuscitation    Method: Oxygen        Measurements      Weight: 3170 g 6 lb 15.8 oz Length: 48.3 cm     Head circum.: 34.5 cm Chest circum.: 34.5 cm          Abdominal circum.: 34 cm           Placenta    Date/time: 2024  Removal: Manual Removal  Appearance: Intact  Disposition: Pathology       Apgars    Living status: Living   Apgar Scoring Key:    0 1 2    Skin color Blue or pale Acrocyanotic Completely pink    Heart rate Absent <100 bpm >100 bpm    Reflex irritability No response Grimace Cry or active withdrawal    Muscle tone Limp Some flexion Active motion    Respiratory effort Absent Weak cry; hypoventilation Good, crying              1 Minute:  5 Minute:  10 Minute:  15 Minute:  20 Minute:      Skin color: 0  1       Heart rate: 2  2       Reflex irritablity: 2  2       Muscle tone: 2  2       Respiratory effort: 2  2       Total: 8  9          Apgars assigned by: DR BARRIOS   disposition: with mother       Skin to Skin    Skin to skin with: Mother       Vaginal Count    No data filed       Delivery (Maternal)    Episiotomy: None  Perineal lacerations: None      Vaginal laceration?: No      Cervical laceration?: No    Clitoral laceration?: No

## 2024-01-25 NOTE — PROGRESS NOTES
[facility] 2SW-J michael Rosa Patient Status:  Inpatient   Age/Gender 38 year old female MRN [id number]   Location [facility] 2SW-J Attending Estela Dockery MD   Hosp Day # 1 PCP Margot Zazueta, DO      Anesthesia Pain Progress Note    Anesthesia Technique:   Spinal Anesthesia     Pain Management Technique:  In addition to available oral supplemental and IV medications  Patient received neuraxial preservative free morphine for post procedural pain control.    Post Procedure Pain Quality:    Adequate    Pain Management Side Effects:  Pruritis improving without treatment.     /80 (BP Location: Right arm)   Pulse 61   Temp 97.7 °F (36.5 °C) (Oral)   Resp 18   Ht 1.6 m (5' 3\")   Wt 97.5 kg (215 lb)   LMP 2023 (Exact Date)   SpO2 97%   Breastfeeding Yes   BMI 38.09 kg/m²       Injection Site: Injection site is intact on inspection     Complications from Pain Management or Anesthesia:   None    All patient questions were answered.  Follow up pain management is separate from intraoperative anesthetic needs.  Pain care is transitioned to primary service, with management by oral medications.    Thank you for asking us to participate in the care of your patient.    Mike Sandy MD, 24, 8:24 AM      Mike Sandy MD, 24, 8:24 AM

## 2024-01-25 NOTE — PROGRESS NOTES
Pt admitted to MB unit. Admission assessment completed. Discharge education initiated with pt. Pt instructed on call light usage. Plan of care discussed with pt, pt verbalizes understanding.

## 2024-01-25 NOTE — ANESTHESIA POSTPROCEDURE EVALUATION
[facility]    Holli HOPE Moe Patient Status:  Inpatient   Age/Gender 38 year old female MRN [id number]   Location [facility] LABOR & DELIVERY Attending Estela Dockery MD   Hosp Day # 0 PCP Margot Zazueta DO       Anesthesia Post-op Note     SECTION    Procedure Summary       Date: 24 Room / Location:  L+D OR   L+D OR    Anesthesia Start:  Anesthesia Stop:     Procedure:  SECTION Diagnosis: (same)    Surgeons: Estela Dockery MD Anesthesiologist: Brenton Connor MD    Anesthesia Type: spinal ASA Status: 2            Anesthesia Type: spinal    Vitals Value Taken Time   BP 99/54 24 2230   Temp 98 24 2231   Pulse 69 241   Resp 26 241   SpO2 89 % 24   Vitals shown include unfiled device data.    Patient Location: Labor and Delivery    Anesthesia Type: spinal    Airway Patency: patent    Postop Pain Control: adequate    Mental Status: preanesthetic baseline    Nausea/Vomiting: none    Cardiopulmonary/Hydration status: stable euvolemic    Complications: no apparent anesthesia related complications    Postop vital signs: stable    Dental Exam: Unchanged from Preop    Patient to be discharged from PACU when criteria met.

## 2024-01-25 NOTE — PLAN OF CARE
Problem: GASTROINTESTINAL - ADULT  Goal: Maintains or returns to baseline bowel function  Description: INTERVENTIONS:  - Assess bowel function  - Maintain adequate hydration with IV or PO as ordered and tolerated  - Evaluate effectiveness of GI medications  - Encourage mobilization and activity  - Obtain nutritional consult as needed  - Establish a toileting routine/schedule  - Consider collaborating with pharmacy to review patient's medication profile  Outcome: Progressing     Problem: POSTPARTUM  Goal: Long Term Goal:Experiences normal postpartum course  Description: INTERVENTIONS:  - Assess and monitor vital signs and lab values.  - Assess fundus and lochia.  - Provide ice/sitz baths for perineum discomfort.  - Monitor healing of incision/episiotomy/laceration, and assess for signs and symptoms of infection and hematoma.  - Assess bladder function and monitor for bladder distention.  - Provide/instruct/assist with pericare as needed.  - Provide VTE prophylaxis as needed.  - Monitor bowel function.  - Encourage ambulation and provide assistance as needed.  - Assess and monitor emotional status and provide social service/psych resources as needed.  - Utilize standard precautions and use personal protective equipment as indicated. Ensure aseptic care of all intravenous lines and invasive tubes/drains.  - Obtain immunization and exposure to communicable diseases history.  Outcome: Progressing  Goal: Optimize infant feeding at the breast  Description: INTERVENTIONS:  - Initiate breast feeding within first hour after birth.   - Monitor effectiveness of current breast feeding efforts.  - Assess support systems available to mother/family.  - Identify cultural beliefs/practices regarding lactation, letdown techniques, maternal food preferences.  - Assess mother's knowledge and previous experience with breast feeding.  - Provide information as needed about early infant feeding cues (e.g., rooting, lip smacking, sucking  fingers/hand) versus late cue of crying.  - Discuss/demonstrate breast feeding aids (e.g., infant sling, nursing footstool/pillows, and breast pumps).  - Encourage mother/other family members to express feelings/concerns, and actively listen.  - Educate father/SO about benefits of breast feeding and how to manage common lactation challenges.  - Recommend avoidance of specific medications or substances incompatible with breast feeding.  - Assess and monitor for signs of nipple pain/trauma.  - Instruct and provide assistance with proper latch.  - Review techniques for milk expression (breast pumping) and storage of breast milk. Provide pumping equipment/supplies, instructions and assistance, as needed.  - Encourage rooming-in and breast feeding on demand.  - Encourage skin-to-skin contact.  - Provide LC support as needed.  - Assess for and manage engorgement.  - Provide breast feeding education handouts and information on community breast feeding support.   Outcome: Progressing  Goal: Establishment of adequate milk supply with medication/procedure interruptions  Description: INTERVENTIONS:  - Review techniques for milk expression (breast pumping).   - Provide pumping equipment/supplies, instructions, and assistance until it is safe to breastfeed infant.  Outcome: Progressing  Goal: Appropriate maternal -  bonding  Description: INTERVENTIONS:  - Assess caregiver- interactions.  - Assess caregiver's emotional status and coping mechanisms.  - Encourage caregiver to participate in  daily care.  - Assess support systems available to mother/family.  - Provide /case management support as needed.  Outcome: Progressing

## 2024-01-25 NOTE — PROGRESS NOTES
[facility]  Post-Partum Caesarean Section Progress Note    Holli HOPE Chinyere-Suni Patient Status:  Inpatient    10/8/1985 MRN [id number]   Location [facility] 2SW-J Attending Estela Dockery MD   Hosp Day # 1 PCP Margot Zazueta,      SUBJECTIVE:    Postpartum Day 1:  Delivery    The patient feels well. The patient denies emotional concerns. Pain is well controlled with current medications.Lochia is minimal. Urinary output is adequate.  The patient is tolerating a  diet. Flatus has been passed.    OBJECTIVE:    Vital signs in last 24 hours:  Temp:  [97.6 °F (36.4 °C)-98.4 °F (36.9 °C)] 98.2 °F (36.8 °C)  Pulse:  [58-92] 69  Resp:  [10-26] 18  BP: ()/(50-92) 119/69  SpO2:  [90 %-99 %] 99 %  Input/Output:  Intake/Output                   24 0700 - 24 0659 (Not Admitted) 24 0700 - 24 0659 24 0700 - 24 0659       Intake    I.V.  --  1375  --    Volume (mL) Oxytocin -- 375 --    Volume (mL) (lactated ringers infusion) -- 1000 --    Total Intake -- 1375 --       Output    Urine  --  600  --    Urine Occurrence -- 1 x --    Output (mL) (Urethral Catheter Double-lumen) -- 600 --    Blood  --  600  --    Blood Output -- 600 --    Total Output -- 1200 --       Net I/O     -- 175 --            General:    alert, appears stated age, and cooperative   Bowel Sounds:  active   Abdomen Soft, NTND   Uterine Fundus:   firm below the umbilicus   Incision:  healing well, no significant drainage, no dehiscence, no significant erythema, well approximated with steri strips.     DVT Evaluation:  No evidence of DVT seen on physical exam.     Data Reviewed:  Lab Results   Component Value Date    WBC 17.9 2024    HGB 10.7 2024    HCT 32.1 2024    .0 2024    PGLU 88 2024                     ASSESSMENT/PLAN:    Status post  section. Doing well postoperatively.   BP's normal  Hb stable  Continue current care.    Ritchie Cadena,  MD  1/25/2024  7:59 AM

## 2024-01-26 RX ORDER — IBUPROFEN 600 MG/1
600 TABLET ORAL EVERY 6 HOURS PRN
Qty: 30 TABLET | Refills: 0 | Status: SHIPPED | OUTPATIENT
Start: 2024-01-26

## 2024-01-26 RX ORDER — HYDROCODONE BITARTRATE AND ACETAMINOPHEN 5; 325 MG/1; MG/1
1 TABLET ORAL EVERY 6 HOURS PRN
Status: SHIPPED | COMMUNITY
Start: 2024-01-26

## 2024-01-26 NOTE — PROGRESS NOTES
OB Progress Note POD#2    S: She is feeling well. Minimal VB. Pain controlled. Breastfeeding. Voiding without difficulty, + flatus, no BM    O:  Blood pressure 128/65, pulse 68, temperature 97.6 °F (36.4 °C), temperature source Oral, resp. rate 18, height 5' 3\" (1.6 m), weight 215 lb (97.5 kg), last menstrual period 02/04/2023, SpO2 99%, currently breastfeeding.    Intake/Output Summary (Last 24 hours) at 1/26/2024 1217  Last data filed at 1/25/2024 1348  Gross per 24 hour   Intake --   Output 600 ml   Net -600 ml       Gen: NAD, AAOx3  Breasts: soft, nontender, nonerythematous  Heart: RRR, no m/r/g  Lungs: CTA B/L  Abdomen: soft, minimally tender, nondistended, incision C/D/I  Gyne: minimal lochia  Ext: + edema         A/P: POD #1 s/p LTCS  1. Continue pain control with tylenol and motrin. Sylvia/oxy PRN  2. Breastfeeding, given encouragement   -- Lactation consult PRN  3. Postpartum anemia   -- Hgb 10.7, asymptomatic   4. DVT ppx: continue ambulation  5. GHTN -- BP controlled and asymptomatic  6. GDMA1 -- f/u postpartum    Continue inpatient observation, DC home tomorrow. Instructions reviewed    Cheyenne Roy D.O.  Critical access hospital and [facility]  Contact via [employer] or cell

## 2024-01-26 NOTE — PAYOR COMM NOTE
--------------  ADMISSION REVIEW     Payor: [employer] CHOICE/HMO/POS/EPO  Subscriber #:  [id number]  Authorization Number: [id number]    Admit date: 24  Admit time:  5:46 PM           [facility]  History & Physical    Holli Rosa Patient Status:  Inpatient    10/8/1985 MRN [id number]   Location [facility] LABOR & DELIVERY Attending Estela Dockery MD   Hosp Day # 0 PCP Margot Zazueta, DO     SUBJECTIVE:    Holli Rosa is a 38 year old  at 39+0 weeks here with elevated blood pressure and oligohydramnios. AKASH in the office today was 3. Blood pressure in the office 162/82, prior to that were 120-130s/80s. No HA or vision changes.    Prenatal issues:  1) H/o  for fetal intolerance to labor  2) H/o gestational HTN (severe) at 38 weeks  3) GBS positive  4) H/o LEEP 2019  5) AMA  6) GDM A1        .0     Recent Labs   Lab 24  1748   GLU 81   BUN 13   CREATSERUM 0.60   EGFRCR 119   CA 9.4   ALB 3.2*      K 4.3      CO2 22.0   ALKPHO 163*   AST 22   BILT 0.5   TP 7.5        ASSESSMENT:   at 39+0 weeks here with oligohydramnios and gestational HTN, history of , delivery is indicated via repeat .    PLAN:  1) FHT category I  2) RCS  the risks of repeat  were reviewed including risk of infection, bleeding, and increased risk of damage to surrounding structures like bowel, bladder, tubes, and ovaries. Questions were answered, and she gave informed consent.  3) Anesthesia saw patient, NPO, IV fluids, labs done    Risks, benefits, alternatives and possible complications have been discussed in detail with the patient.  Pre-admission, admission, and post admission procedures and expectations were discussed in detail.  All questions answered, all appropriate consents will be signed at the Hospital. Admission is planned for today.   Continue present management..    Estela Dockery MD  2024  9:35  PM          DELIVERY NOTE:      Date of procedure: 24     Preoperative diagnosis:   1) Oligohydramnios  2) Gestational HTN     Postoperative diagnosis:  The same     Procedure: Repeat Low Transverse  Delivery via Pfannensteil Skin Incision     Surgeon: Dr. Estela Dockery  Assistant: Dr. Alanna Mittal     A skilled physician assistant was medically necessary for this procedure. The assistant provided retraction and visualization throughout the procedure and assisted with delivery of the infant.     Anesthesia: spinal  Fluids: 1000mL LR  EBL: 600 mL  UOP: see MAR  Antibiotics: 2g ancef  DVT prophylaxis: SCDs     Findings:  1) Live born female infant, apgars 8/9, weight 7#  2) Otherwise normal appearing uterus, tubes, and ovaries. 4cm subserosal fibroid     Complications: none     Specimens: placenta (oligo)     Indication for procedure: This is a  at 39+0 weeks here with gestational hypertension and oligohydramnios. Delivery was recommended via repeat . The risks of the procedure were reviewed, and she gave informed consent.     Procedure. The patient was taken to the operating room and placed under spinal anesthesia. She was prepped and draped in supine position. Her anesthesia was tested and found to be adequate. A Pfannensteil incision was made with the scalpel and extended to the underlying fascia with the scalpel. The fascia was incised with the scalpel, and the incision was extended horizontally. The muscles were  in the midline bluntly, and the peritoneum was entered bluntly.     The bladder blade was inserted. A low transverse uterine incision was made with the scalpel and extended bluntly. The infant's head was delivered, followed by the body. The infant was handed to the neonatologist. Cord blood was collected. The placenta was delivered manually.     The Marky was inserted. The uterine incision was then repaired with 0 vicryl in running fashion. A bleeding area on  the left was oversewn with 0 vicryl in a figure of 8. It was hemostatic afterwards. The Marky was removed.     The gutters were then cleared of all clots. On repeat examination of the hysterotomy, good hemostasis was noted. The fascia was closed with 0 vicryl in running fashion. The subcutaneous tissue was irrigated and closed with 3-0 plain gut in interrupted fashion. The skin was closed with 4-0 monocryl in subcuticular fashion. Dressing was dermabond (tape allergy).     She was then taken to the recovery room in good condition. She tolerated the procedure well.     Rose Mary Rosa [[id number]]       Labor Events     labor?: No   steroids?: None  Antibiotics received during labor?: Yes  Antibiotics (enter # doses in comment): cefazolin  Rupture date/time: 2024     Rupture type: AROM  Intrapartum & labor complications: Oligohydramnios, Gestational hypertension         Pickens Presentation    Presentation: Vertex         Operative Delivery    Operative Vaginal Delivery: N/A                                   Shoulder Dystocia    Shoulder Dystocia: N/A                  Anesthesia    Method: Spinal                    Pickens Delivery       Delivery date/time:  24 21:55:31   Delivery type: Caesarean Section     Details:  Trial of labor after : No    categorization: Repeat    priority: unscheduled   Indications for : Prior Uterine Surgery   Skin incision type: 1 Pfannenstiel   Uterine Incision type: Low Transverse      Delivery location: OR         Delivery Providers    Delivering Clinician: Estela Dockery MD    Delivery personnel:  Provider Role   Rox Groves RN Baby Nurse   Yvette Garzon RN Delivery Nurse   Covert, MD Aguilar Neonatologist                Cord    Vessels: 3 Vessels  Complications: None  Timed cord clamping: Yes  Cord blood disposition: to lab  Gases sent?: No         Resuscitation    Method: Oxygen          Creede Measurements       Weight: 3170 g 6 lb 15.8 oz Length: 48.3 cm      Head circum.: 34.5 cm Chest circum.: 34.5 cm             Abdominal circum.: 34 cm               Placenta    Date/time: 2024  Removal: Manual Removal  Appearance: Intact  Disposition: Pathology         Apgars    Living status: Living    Apgar Scoring Key:    0 1 2     Skin color Blue or pale Acrocyanotic Completely pink     Heart rate Absent <100 bpm >100 bpm     Reflex irritability No response Grimace Cry or active withdrawal     Muscle tone Limp Some flexion Active motion     Respiratory effort Absent Weak cry; hypoventilation Good, crying                1 Minute:  5 Minute:  10 Minute:  15 Minute:  20 Minute:       Skin color: 0  1          Heart rate: 2  2          Reflex irritablity: 2  2          Muscle tone: 2  2          Respiratory effort: 2  2          Total: 8  9             Apgars assigned by: DR BARRIOS  Creede disposition: with mother         Skin to Skin    Skin to skin with: Mother         Vaginal Count    No data filed         Delivery (Maternal)    Episiotomy: None  Perineal lacerations: None        Vaginal laceration?: No        Cervical laceration?: No     Clitoral laceration?: No                            OB :  SUBJECTIVE:     Postpartum Day 1:  Delivery     The patient feels well. The patient denies emotional concerns. Pain is well controlled with current medications.Lochia is minimal. Urinary output is adequate.  The patient is tolerating a  diet. Flatus has been passed.      ASSESSMENT/PLAN:     Status post  section. Doing well postoperatively.   BP's normal  Hb stable  Continue current care.     Ritchie Cadena MD  2024  7:59 AM    MEDICATIONS ADMINISTERED IN LAST 1 DAY:  acetaminophen (Tylenol Extra Strength) tab 1,000 mg       Date Action Dose Route User    2024 0258 Given 1,000 mg Oral Patrick Thomas RN    2024 Given 1,000 mg Oral Patrick Thomas RN     1/25/2024 1430 Given 1,000 mg Oral Roseann Dominguez RN    1/25/2024 0905 Given 1,000 mg Oral Roseann Dominguez RN          docusate sodium (Colace) cap 100 mg       Date Action Dose Route User    1/26/2024 0601 Given 100 mg Oral Patrick Thomas RN    1/25/2024 1741 Given 100 mg Oral Roseann Dominguez RN          enoxaparin (Lovenox) 40 MG/0.4ML SUBQ injection 40 mg       Date Action Dose Route User    1/25/2024 0905 Given 40 mg Subcutaneous (Right Lower Abdomen) Roseann Dominguez RN          gabapentin (Neurontin) cap 300 mg       Date Action Dose Route User    1/25/2024 2315 Given 300 mg Oral Patrick Thomas RN          ibuprofen (Motrin) tab 600 mg       Date Action Dose Route User    1/26/2024 0600 Given 600 mg Oral Patrick Thomas RN    1/25/2024 2315 Given 600 mg Oral Patrick Thomas RN          ketorolac (Toradol) 30 MG/ML injection 30 mg       Date Action Dose Route User    1/25/2024 1741 Given 30 mg Intravenous Roseann Dominguez RN    1/25/2024 1214 Given 30 mg Intravenous Roseann Dominguez RN            Vitals (last day)       Date/Time Temp Pulse Resp BP SpO2 Weight O2 Device O2 Flow Rate (L/min) [facility]    01/25/24 2000 98.3 °F (36.8 °C) 81 18 123/72 -- -- None (Room air) -- FC    01/25/24 1750 98.2 °F (36.8 °C) 69 18 119/69 99 % -- None (Room air) -- TV    01/25/24 1551 97.8 °F (36.6 °C) 76 18 114/64 94 % -- None (Room air) -- TV    01/25/24 1345 97.6 °F (36.4 °C) 65 18 126/60 96 % -- -- -- DG    01/25/24 1011 98.3 °F (36.8 °C) 73 18 113/67 97 % -- None (Room air) -- TV    01/25/24 0744 97.7 °F (36.5 °C) 61 18 125/80 97 % -- None (Room air) -- TV    01/25/24 0600 -- 60 16 111/71 97 % -- -- -- FC    01/25/24 0530 -- 92 -- -- 97 % -- -- -- FC    01/25/24 0430 -- 59 16 111/77 -- -- None (Room air) -- FC    01/25/24 0300 -- 61 -- -- 97 % -- -- -- FC    01/25/24 0200 -- 65 -- 126/77 98 % -- -- --     01/25/24 0026 98 °F (36.7 °C) 62 16 118/76 97 % -- None (Room air) -- AB

## 2024-01-27 VITALS
HEIGHT: 63 IN | SYSTOLIC BLOOD PRESSURE: 136 MMHG | RESPIRATION RATE: 16 BRPM | DIASTOLIC BLOOD PRESSURE: 80 MMHG | WEIGHT: 215 LBS | TEMPERATURE: 98 F | OXYGEN SATURATION: 99 % | BODY MASS INDEX: 38.09 KG/M2 | HEART RATE: 74 BPM

## 2024-01-27 NOTE — PROGRESS NOTES
NURSING DISCHARGE NOTE    Discharge instructions reviewed with patient and . Patient encouraged to ask questions and discharge paperwork provided. Teal band explained and given to patient. Patient encouraged to make follow up appointment with OB/GYN for 6 weeks postpartum.

## 2024-01-27 NOTE — DISCHARGE SUMMARY
[facility]    Discharge Summary    Holli Rosa Patient Status:  Inpatient    10/8/1985 MRN [id number]   Location [facility] 2SW-J Attending Estela Dockery MD   Hosp Day # 3 PCP Margot Zazueta DO     Date of Admission: 2024 Disposition: Home or Self Care     Date of Discharge: 2023    Admitting Diagnosis: Pregnancy    Discharge Diagnosis:   Patient Active Problem List   Diagnosis    Allergic rhinitis    Right wrist pain    BMI 35.0-35.9,adult    Obesity complicating pregnancy    Pregnancy    Postpartum care and examination of lactating mother    Multigravida of advanced maternal age in second trimester    History of gestational hypertension       Reason for Admission: labor management    Physical Exam:   Vitals:    24 0840   BP: 136/80   Pulse: 74   Resp: 16   Temp: 97.9 °F (36.6 °C)       General: No acute distress  Pulm: nonlabored breathing  Cardiac: regular rate  Abd: soft, nontender, fundus firm below umbilicus, incision clean, dry, intact with steri strips  Ext: nontender lower extremities       History of Present Illness:   Holli Rosa is a 38 year old  at 39+0 weeks here with elevated blood pressure and oligohydramnios. AKASH in the office today was 3. Blood pressure in the office 162/82, prior to that were 120-130s/80s. No HA or vision changes.     Prenatal issues:  1) H/o  for fetal intolerance to labor  2) H/o gestational HTN (severe) at 38 weeks  3) GBS positive  4) H/o LEEP 2019  5) AMA  6) GDM A1    Hospital Course:   Patient was admitted for oligohydramnios, gestational htn, in setting of prior csection, underwent RCS. Postpartum course was uncomplicated and on day of discharge she was meeting all milestones and found stable for discharge home.     Consultations: anesthesia    Procedures: RCS    Complications: none    Discharge Condition: Good         Discharge Medications:      Discharge Medications        START taking these  medications        Instructions Prescription details   ibuprofen 600 MG Tabs  Commonly known as: Motrin      Take 1 tablet (600 mg total) by mouth every 6 (six) hours as needed for Pain.   Quantity: 30 tablet  Refills: 0            CONTINUE taking these medications        Instructions Prescription details   fluticasone propionate 50 MCG/ACT Susp  Commonly known as: Flonase      1 spray by Each Nare route daily.   Refills: 0     HYDROcodone-acetaminophen 5-325 MG Tabs  Commonly known as: Norco      Take 1 tablet by mouth every 6 (six) hours as needed for Pain.   Refills: 0     Multi-Vitamin Daily Tabs      Take by mouth daily.   Refills: 0     prenatal vitamin with DHA 27-0.8-228 MG Caps      Take 1 capsule by mouth daily.   Refills: 0            STOP taking these medications      aspirin 81 MG Tbec        ferrous sulfate 325 (65 FE) MG Tbec                  Where to Get Your Medications        These medications were sent to [id number] - [place], [place] - [address] AT [facility] [address], [phone], [phone]  [facility] [address], [facility] [address]      Phone: [phone]   ibuprofen 600 MG Tabs         Follow up Visits: Follow-up with Dr. Dockery in 6 week      Yaritza Ribera MD  1/27/2024  11:23 AM

## 2024-01-27 NOTE — PLAN OF CARE
Problem: GASTROINTESTINAL - ADULT  Goal: Maintains or returns to baseline bowel function  Description: INTERVENTIONS:  - Assess bowel function  - Maintain adequate hydration with IV or PO as ordered and tolerated  - Evaluate effectiveness of GI medications  - Encourage mobilization and activity  - Obtain nutritional consult as needed  - Establish a toileting routine/schedule  - Consider collaborating with pharmacy to review patient's medication profile  Outcome: Completed     Problem: POSTPARTUM  Goal: Long Term Goal:Experiences normal postpartum course  Description: INTERVENTIONS:  - Assess and monitor vital signs and lab values.  - Assess fundus and lochia.  - Provide ice/sitz baths for perineum discomfort.  - Monitor healing of incision/episiotomy/laceration, and assess for signs and symptoms of infection and hematoma.  - Assess bladder function and monitor for bladder distention.  - Provide/instruct/assist with pericare as needed.  - Provide VTE prophylaxis as needed.  - Monitor bowel function.  - Encourage ambulation and provide assistance as needed.  - Assess and monitor emotional status and provide social service/psych resources as needed.  - Utilize standard precautions and use personal protective equipment as indicated. Ensure aseptic care of all intravenous lines and invasive tubes/drains.  - Obtain immunization and exposure to communicable diseases history.  Outcome: Completed  Goal: Optimize infant feeding at the breast  Description: INTERVENTIONS:  - Initiate breast feeding within first hour after birth.   - Monitor effectiveness of current breast feeding efforts.  - Assess support systems available to mother/family.  - Identify cultural beliefs/practices regarding lactation, letdown techniques, maternal food preferences.  - Assess mother's knowledge and previous experience with breast feeding.  - Provide information as needed about early infant feeding cues (e.g., rooting, lip smacking, sucking  fingers/hand) versus late cue of crying.  - Discuss/demonstrate breast feeding aids (e.g., infant sling, nursing footstool/pillows, and breast pumps).  - Encourage mother/other family members to express feelings/concerns, and actively listen.  - Educate father/SO about benefits of breast feeding and how to manage common lactation challenges.  - Recommend avoidance of specific medications or substances incompatible with breast feeding.  - Assess and monitor for signs of nipple pain/trauma.  - Instruct and provide assistance with proper latch.  - Review techniques for milk expression (breast pumping) and storage of breast milk. Provide pumping equipment/supplies, instructions and assistance, as needed.  - Encourage rooming-in and breast feeding on demand.  - Encourage skin-to-skin contact.  - Provide LC support as needed.  - Assess for and manage engorgement.  - Provide breast feeding education handouts and information on community breast feeding support.   Outcome: Completed  Goal: Establishment of adequate milk supply with medication/procedure interruptions  Description: INTERVENTIONS:  - Review techniques for milk expression (breast pumping).   - Provide pumping equipment/supplies, instructions, and assistance until it is safe to breastfeed infant.  Outcome: Completed  Goal: Appropriate maternal -  bonding  Description: INTERVENTIONS:  - Assess caregiver- interactions.  - Assess caregiver's emotional status and coping mechanisms.  - Encourage caregiver to participate in  daily care.  - Assess support systems available to mother/family.  - Provide /case management support as needed.  Outcome: Completed

## 2024-01-27 NOTE — DISCHARGE INSTRUCTIONS
While you were here we were administering these following medications to you:     Ibuprofen 600 mg every 6 hours on the following schedule: 6 am-12 pm-6 pm-12 am    Tylenol 1000 mg every 6 hours on the following schedule: 3 am-9 am-3 pm-9 pm    Colace 100 mg twice daily at 6 am and 6 pm

## 2024-01-30 ENCOUNTER — TELEPHONE (OUTPATIENT)
Dept: OBGYN UNIT | Facility: HOSPITAL | Age: 39
End: 2024-01-30

## (undated) DEVICE — SWAB PROCTO 16\" NON-STERILE

## (undated) DEVICE — KENDALL SCD EXPRESS SLEEVES, KNEE LENGTH, MEDIUM: Brand: KENDALL SCD

## (undated) DEVICE — REM POLYHESIVE ADULT PATIENT RETURN ELECTRODE: Brand: VALLEYLAB

## (undated) DEVICE — LARGE, DISPOSABLE ALEXIS O C-SECTION PROTECTOR - RETRACTOR: Brand: ALEXIS ® O C-SECTION PROTECTOR - RETRACTOR

## (undated) DEVICE — GAMMEX® NON-LATEX PI TEXTURED SIZE 7.5, STERILE POLYISOPRENE POWDER-FREE SURGICAL GLOVE: Brand: GAMMEX

## (undated) DEVICE — #11 STERILE BLADE: Brand: POLYMER COATED BLADES

## (undated) DEVICE — SOL  .9 1000ML BTL

## (undated) DEVICE — CAUTERY PENCIL

## (undated) DEVICE — ELECTRODE BALL 5MM DBL-511

## (undated) DEVICE — GYN CDS: Brand: MEDLINE INDUSTRIES, INC.

## (undated) NOTE — LETTER
Joy Valera [address]  [place], [address] [facility]    Consent for Operation  Date: __________________                                Time: _______________    1.  I authorize the performance upon Anju Rosa the following operation: procedure has been videotaped, the surgeon will obtain the original videotape. The hospital will not be responsible for storage or maintenance of this tape.   7. For the purpose of advancing medical education, I consent to the admittance of observers to the STATEMENTS REQUIRING INSERTION OR COMPLETION WERE FILLED IN.     Signature of Patient:   ___________________________    When the patient is a minor or mentally incompetent to give consent:  Signature of person authorized to consent for patient: ____________ supplements, and pills I can buy without a prescription (including street drugs/illegal medications). Failure to inform my anesthesiologist about these medicines may increase my risk of anesthetic complications. iv.  If I am allergic to anything or have ha Anesthesiologist Signature     Date   Time  I have discussed the procedure and information above with the patient (or patient’s representative) and answered their questions. The patient or their representative has agreed to have anesthesia services.     ___

## (undated) NOTE — Clinical Note
IUP at 20w4d  Normal level 2 ultrasound  Class II obesity complicating pregnancy  History of LEEP with normal cervical length (transabdominal)  Uterine fibroids    RECOMMENDATIONS:  Continue care with Dr. Angel Torrez ultrasound at 32 weeks.   Efra Louis